# Patient Record
Sex: FEMALE | Race: WHITE | NOT HISPANIC OR LATINO | Employment: OTHER | URBAN - METROPOLITAN AREA
[De-identification: names, ages, dates, MRNs, and addresses within clinical notes are randomized per-mention and may not be internally consistent; named-entity substitution may affect disease eponyms.]

---

## 2017-08-07 ENCOUNTER — LAB REQUISITION (OUTPATIENT)
Dept: LAB | Facility: HOSPITAL | Age: 57
End: 2017-08-07

## 2017-08-07 ENCOUNTER — AMBULATORY SURGICAL CENTER (OUTPATIENT)
Dept: URBAN - METROPOLITAN AREA SURGERY 9 | Facility: SURGERY | Age: 57
End: 2017-08-07
Payer: COMMERCIAL

## 2017-08-07 DIAGNOSIS — D12.3 BENIGN NEOPLASM OF TRANSVERSE COLON: ICD-10-CM

## 2017-08-07 DIAGNOSIS — Z86.010 HISTORY OF COLONIC POLYPS: ICD-10-CM

## 2017-08-07 DIAGNOSIS — Z86.010 PERSONAL HISTORY OF COLONIC POLYPS: ICD-10-CM

## 2017-08-07 PROCEDURE — 45380 COLONOSCOPY AND BIOPSY: CPT | Mod: 33

## 2017-08-07 PROCEDURE — 88305 TISSUE EXAM BY PATHOLOGIST: CPT | Performed by: INTERNAL MEDICINE

## 2017-08-09 LAB
CYTO UR: NORMAL
LAB AP CASE REPORT: NORMAL
LAB AP CLINICAL INFORMATION: NORMAL
Lab: NORMAL
PATH REPORT.FINAL DX SPEC: NORMAL
PATH REPORT.GROSS SPEC: NORMAL

## 2021-02-19 ENCOUNTER — TELEPHONE (OUTPATIENT)
Dept: INTERNAL MEDICINE | Facility: CLINIC | Age: 61
End: 2021-02-19

## 2021-02-19 NOTE — TELEPHONE ENCOUNTER
Caller: Florinda Sunshine    Relationship: Self    Best call back number: [phone]      Medication needed: GABAPENTIN 300 MG CAPSULE 1 CAPSULE 3X DAY, 90 DAY SUPPLY    When do you need the refill by: ASAP    What details did the patient provide when requesting the medication: PATIENT HAD TO RESCHEDULE AN APPOINTMENT DUE TO A COVID EXPOSURE AND WILL RUN OUT OF THE MEDICATION BEFORE THE NEW APPOINTMENT.     Does the patient have less than a 3 day supply:  [] Yes  [x] No    What is the patient's preferred pharmacy: [facility][id number] - [place], [place] - [address] [address] AT [facility] [address] - [phone]  - [phone]

## 2021-03-12 ENCOUNTER — OFFICE VISIT (OUTPATIENT)
Dept: INTERNAL MEDICINE | Facility: CLINIC | Age: 61
End: 2021-03-12

## 2021-03-12 VITALS
RESPIRATION RATE: 18 BRPM | HEART RATE: 90 BPM | DIASTOLIC BLOOD PRESSURE: 76 MMHG | OXYGEN SATURATION: 97 % | SYSTOLIC BLOOD PRESSURE: 124 MMHG | HEIGHT: 61 IN | WEIGHT: 162 LBS | BODY MASS INDEX: 30.58 KG/M2 | TEMPERATURE: 97.3 F

## 2021-03-12 DIAGNOSIS — Z11.59 ENCOUNTER FOR HCV SCREENING TEST FOR LOW RISK PATIENT: ICD-10-CM

## 2021-03-12 DIAGNOSIS — M54.2 NECK PAIN: ICD-10-CM

## 2021-03-12 DIAGNOSIS — R23.2 HOT FLASHES: ICD-10-CM

## 2021-03-12 DIAGNOSIS — Z00.00 WELL ADULT EXAM: Primary | ICD-10-CM

## 2021-03-12 DIAGNOSIS — Z11.4 ENCOUNTER FOR SCREENING FOR HIV: ICD-10-CM

## 2021-03-12 PROCEDURE — 99396 PREV VISIT EST AGE 40-64: CPT | Performed by: INTERNAL MEDICINE

## 2021-03-12 RX ORDER — GABAPENTIN 300 MG/1
300 CAPSULE ORAL 3 TIMES DAILY
COMMUNITY
Start: 2021-02-19 | End: 2021-03-12 | Stop reason: SDUPTHER

## 2021-03-12 RX ORDER — GABAPENTIN 300 MG/1
300 CAPSULE ORAL 3 TIMES DAILY
Qty: 270 CAPSULE | Refills: 0 | Status: SHIPPED | OUTPATIENT
Start: 2021-03-17 | End: 2021-04-28 | Stop reason: SDUPTHER

## 2021-03-12 RX ORDER — GUAIFENESIN, PSEUDOEPHEDRINE HYDROCHLORIDE 600; 60 MG/1; MG/1
TABLET, EXTENDED RELEASE ORAL
COMMUNITY

## 2021-03-12 RX ORDER — HYDROCORTISONE ACETATE 0.5 %
CREAM (GRAM) TOPICAL
COMMUNITY

## 2021-03-12 RX ORDER — GABAPENTIN 300 MG/1
300 CAPSULE ORAL 3 TIMES DAILY
Start: 2021-03-12 | End: 2021-03-12 | Stop reason: SDUPTHER

## 2021-03-12 RX ORDER — FLUTICASONE PROPIONATE 50 MCG
SPRAY, SUSPENSION (ML) NASAL
COMMUNITY

## 2021-03-12 RX ORDER — ESTRADIOL 1 MG/1
1 TABLET ORAL DAILY
Qty: 30 TABLET | Refills: 11 | Status: SHIPPED | OUTPATIENT
Start: 2021-03-12 | End: 2023-03-13

## 2021-03-12 RX ORDER — NAPROXEN SODIUM 220 MG
TABLET ORAL
COMMUNITY
End: 2021-03-12 | Stop reason: SDUPTHER

## 2021-03-12 RX ORDER — ESTRADIOL 1 MG/1
1 TABLET ORAL DAILY
COMMUNITY
Start: 2020-10-12 | End: 2021-03-12 | Stop reason: SDUPTHER

## 2021-03-12 RX ORDER — MELATONIN/PYRIDOXINE HCL (B6) 10 MG-10MG
TABLET,IMMED, EXTENDED RELEASE, BIPHASIC ORAL
COMMUNITY

## 2021-03-12 RX ORDER — METAXALONE 800 MG/1
800 TABLET ORAL 3 TIMES DAILY
Qty: 90 TABLET | Refills: 2 | Status: SHIPPED | OUTPATIENT
Start: 2021-03-12 | End: 2021-04-28 | Stop reason: SDUPTHER

## 2021-03-12 RX ORDER — DIPHENOXYLATE HYDROCHLORIDE AND ATROPINE SULFATE 2.5; .025 MG/1; MG/1
TABLET ORAL
COMMUNITY

## 2021-03-12 RX ORDER — INFLUENZA A VIRUS A/SINGAPORE/GP1908/2015 IVR-180 (H1N1) ANTIGEN (MDCK CELL DERIVED, PROPIOLACTONE INACTIVATED), INFLUENZA A VIRUS A/NORTH CAROLINA/04/2016 (H3N2) HEMAGGLUTININ ANTIGEN (MDCK CELL DERIVED, PROPIOLACTONE INACTIVATED), INFLUENZA B VIRUS B/IOWA/06/2017 HEMAGGLUTININ ANTIGEN (MDCK CELL DERIVED, PROPIOLACTONE INACTIVATED), INFLUENZA B VIRUS B/SINGAPORE/INFTT-16-0610/2016 HEMAGGLUTININ ANTIGEN (MDCK CELL DERIVED, PROPIOLACTONE INACTIVATED) 15; 15; 15; 15 UG/.5ML; UG/.5ML; UG/.5ML; UG/.5ML
INJECTION, SUSPENSION INTRAMUSCULAR
Status: ON HOLD | COMMUNITY
Start: 2020-10-09 | End: 2022-08-19

## 2021-03-12 RX ORDER — NAPROXEN SODIUM 220 MG
220 TABLET ORAL 2 TIMES DAILY WITH MEALS
Start: 2021-03-12 | End: 2021-04-28 | Stop reason: SDUPTHER

## 2021-03-12 RX ORDER — LORATADINE 10 MG/1
TABLET ORAL
COMMUNITY

## 2021-03-12 NOTE — PROGRESS NOTES
"Chief Complaint   Patient presents with   • Annual Exam       Subjective   Florinda Sunshine is a 61 y.o. female.     Having some neck pain, gabapentin previously worked well but not working as well recently; no numbness, tingling; no radiating pain, no issues with arm, hands, numbness, tingling       The following portions of the patient's history were reviewed and updated as appropriate: allergies, current medications, past family history, past medical history, past social history, past surgical history, and problem list.    Review of Systems      Objective   Body mass index is 30.61 kg/m².   Vitals:    03/12/21 0854   BP: 124/76   BP Location: Left arm   Patient Position: Sitting   Cuff Size: Adult   Pulse: 90   Resp: 18   Temp: 97.3 °F (36.3 °C)   SpO2: 97%   Weight: 73.5 kg (162 lb)   Height: 154.9 cm (61\")        Physical Exam      Current Outpatient Medications:   •  Acetaminophen (TYLENOL ARTHRITIS EXT RELIEF PO), , Disp: , Rfl:   •  calcium carb-cholecalciferol (Calcium 600+D) 600-800 MG-UNIT tablet, , Disp: , Rfl:   •  esomeprazole (nexIUM 24HR) 20 MG capsule, Take 1 capsule by mouth Every Morning Before Breakfast., Disp: 90 capsule, Rfl: 3  •  estradiol (ESTRACE) 1 MG tablet, Take 1 tablet by mouth Daily for 360 days., Disp: 30 tablet, Rfl: 11  •  fluticasone (FLONASE) 50 MCG/ACT nasal spray, , Disp: , Rfl:   •  gabapentin (NEURONTIN) 300 MG capsule, Take 1 capsule by mouth 3 (Three) Times a Day., Disp: , Rfl:   •  Glucosamine-Chondroit-Vit C-Mn (Glucosamine 1500 Complex) capsule, , Disp: , Rfl:   •  Influenza Vac Subunit Quad (Flucelvax Quadrivalent) 0.5 ML suspension prefilled syringe injection, , Disp: , Rfl:   •  loratadine (Claritin) 10 MG tablet, , Disp: , Rfl:   •  Melatonin 10-10 MG tablet controlled-release, , Disp: , Rfl:   •  multivitamin (THERAGRAN) tablet tablet, Take  by mouth., Disp: , Rfl:   •  naproxen sodium (ALEVE) 220 MG tablet, Take 1 tablet by mouth 2 (Two) Times a Day With Meals., Disp: " , Rfl:   •  Probiotic Product (PROBIOTIC-10 PO), , Disp: , Rfl:   •  pseudoephedrine-guaifenesin (Mucinex D)  MG per 12 hr tablet, , Disp: , Rfl:   •  metaxalone (Skelaxin) 800 MG tablet, Take 1 tablet by mouth 3 (Three) Times a Day., Disp: 90 tablet, Rfl: 2     No results found for: CBCDIF, CMP, LIPIDINTERP, HGBA1C, TSH, SZWU52SP, PSA, TESTOSTERONE     Health Maintenance   Topic Date Due   • COLONOSCOPY  Never done   • ZOSTER VACCINE (1 of 2) 01/18/2011   • INFLUENZA VACCINE  08/01/2020   • MAMMOGRAM  10/12/2022   • TDAP/TD VACCINES (3 - Td) 02/06/2030        Immunization History   Administered Date(s) Administered   • Hepatitis A 01/21/2019, 08/02/2019   • Hepatitis B 08/02/2019   • Influenza, Unspecified 02/01/2019   • Tdap 01/01/2010, 02/06/2020   • Zoster, Unspecified 10/17/2019, 02/24/2020         Assessment/Plan   Diagnoses and all orders for this visit:    1. Well adult exam (Primary)  -     CBC & Differential  -     Comprehensive Metabolic Panel  -     TSH  -     Lipid Panel With / Chol / HDL Ratio  -     Vitamin D 25 Hydroxy  -     Hemoglobin A1c    2. Encounter for screening for HIV  -     HIV-1/O/2 Ag/Ab w Reflex    3. Encounter for HCV screening test for low risk patient  -     Hepatitis C antibody    4. Neck pain  -     metaxalone (Skelaxin) 800 MG tablet; Take 1 tablet by mouth 3 (Three) Times a Day.  Dispense: 90 tablet; Refill: 2  -     Ambulatory Referral to Physical Therapy Evaluate and treat  -     gabapentin (NEURONTIN) 300 MG capsule; Take 1 capsule by mouth 3 (Three) Times a Day.  -     naproxen sodium (ALEVE) 220 MG tablet; Take 1 tablet by mouth 2 (Two) Times a Day With Meals.    - follow up 6 weeks and consider further evaluation, imaging, procedural intervention pending above    5. Hot flashes  -     estradiol (ESTRACE) 1 MG tablet; Take 1 tablet by mouth Daily for 360 days.  Dispense: 30 tablet; Refill: 11    Other orders  -     esomeprazole (nexIUM 24HR) 20 MG capsule; Take 1  capsule by mouth Every Morning Before Breakfast.  Dispense: 90 capsule; Refill: 3             Well adult exam  - labs checked and evaluated    PAP - done in 2020, normal, with GYN, get records  Mammogram - done in 10/12/2020, normal, with GYN at [facility]  Colonoscopy - 8/7/2017,  SINGLE FRAGMENT OF TUBULAR ADENOMA WITH ONLY LOW-GRADE DYSPLASIA, next scheduled 2022 she states  Glaucoma - yearly  AAA - na, never smoekr  Lung cancer - na, never smoker  DXA - at 66  HIV - checking  HCV - checking  DM - checking  HLD - checking  Smoking - na, never smoker  Depression - no, [id number]  Vaccines - received covid vaccine 2 days ago, holding on other vaccines for now  Falls - na  Alcohol Screening - na    Discussed mental health, sexual health, substance use, abuse, anticipatory guidance given.      Return in about 6 weeks (around 4/23/2021).     Harlan Figueredo MD  [facility]  Internal Medicine and Pediatrics  Phone: [phone]  Fax: [phone]

## 2021-03-13 LAB
25(OH)D3+25(OH)D2 SERPL-MCNC: 71.6 NG/ML (ref 30–100)
ALBUMIN SERPL-MCNC: 4.6 G/DL (ref 3.5–5.2)
ALBUMIN/GLOB SERPL: 2.1 G/DL
ALP SERPL-CCNC: 76 U/L (ref 39–117)
ALT SERPL-CCNC: 13 U/L (ref 1–33)
AST SERPL-CCNC: 22 U/L (ref 1–32)
BASOPHILS # BLD AUTO: 0.04 10*3/MM3 (ref 0–0.2)
BASOPHILS NFR BLD AUTO: 0.8 % (ref 0–1.5)
BILIRUB SERPL-MCNC: 0.4 MG/DL (ref 0–1.2)
BUN SERPL-MCNC: 7 MG/DL (ref 8–23)
BUN/CREAT SERPL: 10.6 (ref 7–25)
CALCIUM SERPL-MCNC: 9.5 MG/DL (ref 8.6–10.5)
CHLORIDE SERPL-SCNC: 102 MMOL/L (ref 98–107)
CHOLEST SERPL-MCNC: 205 MG/DL (ref 0–200)
CHOLEST/HDLC SERPL: 2.47 {RATIO}
CO2 SERPL-SCNC: 26.4 MMOL/L (ref 22–29)
CREAT SERPL-MCNC: 0.66 MG/DL (ref 0.57–1)
EOSINOPHIL # BLD AUTO: 0.11 10*3/MM3 (ref 0–0.4)
EOSINOPHIL NFR BLD AUTO: 2.3 % (ref 0.3–6.2)
ERYTHROCYTE [DISTWIDTH] IN BLOOD BY AUTOMATED COUNT: 12 % (ref 12.3–15.4)
GLOBULIN SER CALC-MCNC: 2.2 GM/DL
GLUCOSE SERPL-MCNC: 94 MG/DL (ref 65–99)
HBA1C MFR BLD: 5.3 % (ref 4.8–5.6)
HCT VFR BLD AUTO: 41.8 % (ref 34–46.6)
HCV AB S/CO SERPL IA: <0.1 S/CO RATIO (ref 0–0.9)
HDLC SERPL-MCNC: 83 MG/DL (ref 40–60)
HGB BLD-MCNC: 14 G/DL (ref 12–15.9)
HIV 1+2 AB+HIV1 P24 AG SERPL QL IA: NON REACTIVE
IMM GRANULOCYTES # BLD AUTO: 0.01 10*3/MM3 (ref 0–0.05)
IMM GRANULOCYTES NFR BLD AUTO: 0.2 % (ref 0–0.5)
LDLC SERPL CALC-MCNC: 108 MG/DL (ref 0–100)
LYMPHOCYTES # BLD AUTO: 1.73 10*3/MM3 (ref 0.7–3.1)
LYMPHOCYTES NFR BLD AUTO: 36.2 % (ref 19.6–45.3)
MCH RBC QN AUTO: 30 PG (ref 26.6–33)
MCHC RBC AUTO-ENTMCNC: 33.5 G/DL (ref 31.5–35.7)
MCV RBC AUTO: 89.5 FL (ref 79–97)
MONOCYTES # BLD AUTO: 0.38 10*3/MM3 (ref 0.1–0.9)
MONOCYTES NFR BLD AUTO: 7.9 % (ref 5–12)
NEUTROPHILS # BLD AUTO: 2.51 10*3/MM3 (ref 1.7–7)
NEUTROPHILS NFR BLD AUTO: 52.6 % (ref 42.7–76)
NRBC BLD AUTO-RTO: 0 /100 WBC (ref 0–0.2)
PLATELET # BLD AUTO: 329 10*3/MM3 (ref 140–450)
POTASSIUM SERPL-SCNC: 4.4 MMOL/L (ref 3.5–5.2)
PROT SERPL-MCNC: 6.8 G/DL (ref 6–8.5)
RBC # BLD AUTO: 4.67 10*6/MM3 (ref 3.77–5.28)
SODIUM SERPL-SCNC: 138 MMOL/L (ref 136–145)
TRIGL SERPL-MCNC: 78 MG/DL (ref 0–150)
TSH SERPL DL<=0.005 MIU/L-ACNC: 1.09 UIU/ML (ref 0.27–4.2)
VLDLC SERPL CALC-MCNC: 14 MG/DL (ref 5–40)
WBC # BLD AUTO: 4.78 10*3/MM3 (ref 3.4–10.8)

## 2021-04-01 ENCOUNTER — TREATMENT (OUTPATIENT)
Dept: PHYSICAL THERAPY | Facility: CLINIC | Age: 61
End: 2021-04-01

## 2021-04-01 DIAGNOSIS — M62.9 MUSCULOSKELETAL DISORDER INVOLVING UPPER TRAPEZIUS MUSCLE: ICD-10-CM

## 2021-04-01 DIAGNOSIS — R52 PAIN AGGRAVATED BY LIFTING: ICD-10-CM

## 2021-04-01 DIAGNOSIS — M54.2 PAIN, NECK: Primary | ICD-10-CM

## 2021-04-01 PROCEDURE — 97110 THERAPEUTIC EXERCISES: CPT | Performed by: PHYSICAL THERAPIST

## 2021-04-01 PROCEDURE — 97162 PT EVAL MOD COMPLEX 30 MIN: CPT | Performed by: PHYSICAL THERAPIST

## 2021-04-01 NOTE — PROGRESS NOTES
"  Physical Therapy Initial Evaluation and Plan of Care    Patient: Florinda Sunshine   : 1960  Diagnosis/ICD-10 Code:  Pain, neck [M54.2]  Referring practitioner: Harlan Figueerdo MD  Date of Initial Visit: 2021  Today's Date: 2021  Patient seen for 1 sessions           Subjective Questionnaire: NDI:20%      Subjective Evaluation    History of Present Illness  Mechanism of injury: Pt presents for physical therapy evaluation with a 10-12 year history of Rt sided cervical spine pain of insidious onset. Pt states that she received epidurals for her neck pain back when it first started at that her pain has been relieved since then. Pt states that in the last year her Rt neck pain has slowly began to increase. Pt states her cervical spine pain is only on the Rt side and extends from the back of her skull down to her upper shoulder. She also states she sometimes gets a pain down the back of her neck that feels like a \"string is being plucked\". Pt states she has a history of carpal tunnel on both sides and had surgery on her Rt hand 4 years ago and on her Lt hand 2 years ago. Pt states her pain is worse on stressful days and doesn't feel as though activity bothers it. Pt also stated that she does water aerobics and her neck tends to feel much better/looser after her workouts. Pt went to her doctor on 3/12 and was prescribed muscle relaxants, but states she hasn't noticed a difference from taking them and she states she has been using Gabapentin for her neck pain for 10 years. 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      Patient Occupation: Retired teacher Quality of life: good    Pain  Current pain rating: 3  At worst pain ratin (Usually at the end of the day)  Location: Rt cervical spine  Quality: dull ache  Alleviating factors: Donut pillow, used to use heat, icy hot   Exacerbated by: Reading, flexed head position, driving " long distances (tense , hiking)    Diagnostic Tests  X-ray: abnormal (10 years ago, DDD)    Treatments  Previous treatment: physical therapy (10 years ago neck)  Patient Goals  Patient goals for therapy: decreased pain, increased motion, increased strength and return to sport/leisure activities  Patient goal: Reading without pain       Objective          Palpation     Right   Hypertonic in the levator scapulae, suboccipitals and upper trapezius. Tenderness of the levator scapulae, suboccipitals and upper trapezius.     Cervical Spine Comments  Right suboccipitals: Mod.     Right Shoulder Comments  Right levator scapulae: Scapular Mod. Right upper trapezius: Subcranial mod.     Active Range of Motion   Cervical/Thoracic Spine   Cervical    Flexion: Neck active flexion: 100% with pain  Extension: Neck active extension: 100%   Left lateral flexion: Neck active lateral bend left: 75%, pain Rt.   Right lateral flexion: Neck active lateral bend right: 100%   Left rotation: Neck active rotation left: 75%   Right rotation: Neck active rotation right: 75% with pain    Passive Range of Motion   Cervical/Thoracic Spine   Cervical   Left lateral flexion: Neck passive lateral bend left: 75% with pain  Right lateral flexion: Neck passive lateral bend right: 100%     Additional Passive Range of Motion Details  Upglides/downglides C2-C7 WNL    Strength/Myotome Testing     Left Shoulder     Planes of Motion   Flexion: 5   Abduction: 5     Right Shoulder     Planes of Motion   Flexion: 5   Abduction: 4+ (Rt neck pain)     Left Elbow   Flexion: 5  Extension: 5    Right Elbow   Flexion: 5  Extension: 5    Tests   Cervical   Deep neck flexor endurance: 35 seconds    Left   Negative alar ligament integrity and Spurling's sign.     Right   Positive cervical distraction (Relieving).   Negative alar ligament integrity, Spurling's sign and transverse ligament.     Additional Tests Details  Upper trap length test: + concordant  Levator  scap length test: -     Flexion+cervical rotation: Lt - positive on Rt, Rt WNL    Vertebral artery test: negative bilaterally      Assessment & Plan     Assessment  Impairments: abnormal muscle firing, abnormal muscle tone, abnormal or restricted ROM, impaired physical strength, lacks appropriate home exercise program and pain with function  Assessment details: Florinda Sunshine is a 61 y.o. year-old female referred to physical therapy for Rt sided cervical spine pain. She presents with a stable clinical presentation. Signs and symptoms are consistent with physical therapy diagnosis of cervical spine pain with mobility deficits and Rt upper trapezius muscle tone/pain as evidenced by painful/restricted cervical AROM, positive relevant special tests, shoulder strength deficits on Rt/concordant sign, and pain/restriction with cervical PROM. Patient is appropriate for skilled physical therapy in order to reduce pain and increase ease with daily mobility to improve her tolerance for driving long distance or reading her books without increased pain/increased tension in her Rt lateral neck. Plan is to see Pt 2x per week to improve above impairments and activity limitations.    Prognosis: good  Functional Limitations: lifting and uncomfortable because of pain  Goals  Plan Goals: STG's (0-4 weeks)    1. Pt will be compliant with HEP.  2. Pt will demonstrate 5/5 pain free shoulder abduction.  3. Pt will have </=2/10 with cervical flexion + rotation test to the left.  4. Pt will report being able to drive without hiking her shoulders.  5. Pt will have pain free cervical SB to the Lt.    LTG's (4-8 weeks)    1. Pt will be independent with HEP.  2. Pt will have WNL cervical AROM without pain >/=1-2/10  3. Pt will have no symptom provocation with UT length testing on Rt.  4. Pt will decrease NDI score to </= 7%    Plan  Therapy options: will be seen for skilled physical therapy services  Planned modality interventions: cryotherapy,  dry needling, electrical stimulation/Russian stimulation, traction, ultrasound, thermotherapy (hydrocollator packs) and TENS  Planned therapy interventions: ADL retraining, body mechanics training, flexibility, functional ROM exercises, home exercise program, IADL retraining, joint mobilization, therapeutic activities, stretching, strengthening, spinal/joint mobilization, soft tissue mobilization, postural training, neuromuscular re-education, motor coordination training and manual therapy  Frequency: 2x week  Duration in visits: 16  Treatment plan discussed with: patient  Plan details: See assessment        Manual Therapy:    5     mins  37280;  Therapeutic Exercise:     15    mins  73113;     Neuromuscular Katherin:        mins  01466;    Therapeutic Activity:          mins  94430;     Gait Training:           mins  75128;     Ultrasound:          mins  14443;    Electrical Stimulation:         mins  64461 ( );  Dry Needling          mins self-pay    Timed Treatment:   20   mins   Total Treatment:     60   mins    PT SIGNATURE: Amina Navarro PT, DPT; [employer] student  DATE TREATMENT INITIATED: 4/1/2021    Initial Certification  Certification Period: 6/30/2021  I certify that the therapy services are furnished while this patient is under my care.  The services outlined above are required by this patient, and will be reviewed every 90 days.     PHYSICIAN: Harlan Figueredo MD      DATE:     Please sign and return via fax to [phone].. Thank you, [facility].

## 2021-04-05 ENCOUNTER — TREATMENT (OUTPATIENT)
Dept: PHYSICAL THERAPY | Facility: CLINIC | Age: 61
End: 2021-04-05

## 2021-04-05 DIAGNOSIS — M62.9 MUSCULOSKELETAL DISORDER INVOLVING UPPER TRAPEZIUS MUSCLE: ICD-10-CM

## 2021-04-05 DIAGNOSIS — R52 PAIN AGGRAVATED BY LIFTING: ICD-10-CM

## 2021-04-05 DIAGNOSIS — M54.2 PAIN, NECK: Primary | ICD-10-CM

## 2021-04-05 PROCEDURE — 97140 MANUAL THERAPY 1/> REGIONS: CPT | Performed by: PHYSICAL THERAPIST

## 2021-04-05 PROCEDURE — 97110 THERAPEUTIC EXERCISES: CPT | Performed by: PHYSICAL THERAPIST

## 2021-04-05 NOTE — PROGRESS NOTES
Physical Therapy Daily Progress Note      Patient: Florinda Sunshine   : 1960  Diagnosis/ICD-10 Code:  Pain, neck [M54.2]  Referring practitioner: Harlan Figueredo MD  Date of Initial Visit: Type: THERAPY  Noted: 2021  Today's Date: 2021  Patient seen for 2 sessions         Florinda Sunshine reports: Pt states that she has felt a little better since her first day and is getting some relief of symptoms from performing her exercises, but states she felt a new sharp pain in Rt side of her neck on Friday that happened once and hasn't come back.    Subjective     Objective          Passive Range of Motion     Additional Passive Range of Motion Details  PAIVM: Upglide C3-C4 painful      See Exercise, Manual, and Modality Logs for complete treatment.       Assessment & Plan     Assessment  Assessment details: Pt tolerated treatment well. Pt with decreases in pain/complaints of tightness in Rt lateral cervical spine post manual therapy techniques. Pt progressed with SNAG at C3-C4 with upglide to improve opening of C3-C4 and decrease complaints of pain/TTP. Pt progressed with TB rowing exercise to increase LT strength/endurance in order to reciprocally inhibit the UT and decrease tone/complaints of tightness. Plan is to continue to see Pt 2x per week and continue to progress UT strengthening/endurance exercises/cervical spine manual therapy techniques for continued relief of lateral cervical spine tightness/pain.        Progress per Plan of Care           Manual Therapy:     11    mins  98216;  Therapeutic Exercise:     38    mins  90505;     Neuromuscular Katherin:        mins  99471;    Therapeutic Activity:          mins  03575;     Gait Training:           mins  96455;     Ultrasound:          mins  72781;    Electrical Stimulation:        mins  15310 ( );  Dry Needling          mins self-pay    Timed Treatment:   49   mins   Total Treatment:     49   mins    [employer] [place] Student PT    Hiral Linares,  PT  Physical Therapist

## 2021-04-09 ENCOUNTER — TREATMENT (OUTPATIENT)
Dept: PHYSICAL THERAPY | Facility: CLINIC | Age: 61
End: 2021-04-09

## 2021-04-09 DIAGNOSIS — R52 PAIN AGGRAVATED BY LIFTING: ICD-10-CM

## 2021-04-09 DIAGNOSIS — M54.2 PAIN, NECK: Primary | ICD-10-CM

## 2021-04-09 DIAGNOSIS — M62.9 MUSCULOSKELETAL DISORDER INVOLVING UPPER TRAPEZIUS MUSCLE: ICD-10-CM

## 2021-04-09 PROCEDURE — 97110 THERAPEUTIC EXERCISES: CPT | Performed by: PHYSICAL THERAPIST

## 2021-04-09 PROCEDURE — 97530 THERAPEUTIC ACTIVITIES: CPT | Performed by: PHYSICAL THERAPIST

## 2021-04-09 PROCEDURE — 97140 MANUAL THERAPY 1/> REGIONS: CPT | Performed by: PHYSICAL THERAPIST

## 2021-04-09 NOTE — PROGRESS NOTES
Physical Therapy Daily Progress Note    Patient: Florinda Sunshine   : 1960  Diagnosis/ICD-10 Code:  Pain, neck [M54.2]  Referring practitioner: Harlan Figueredo MD  Date of Initial Visit: Type: THERAPY  Noted: 2021  Today's Date: 2021  Patient seen for 3 sessions         Florinda Sunshine reports: Pt states her neck has been feeling better. She states she has noticed less pain/tension in her neck at the end of the day and when she is reading.    Subjective     Objective   See Exercise, Manual, and Modality Logs for complete treatment.       Assessment & Plan     Assessment  Assessment details: Pt tolerate treatment well today. Pt with subjective decreases in pain/tension through her Rt cervical spine. Pt with subjective decreases in pain post manual therapy techniques. Pt progressed with OH pressing exercise with OH shrug and 's carry exercise to increase upper trap strength/endurance for continued reductions in pain/tension in Rt cervical spine/UT. Plan is to continue to see Pt 2x per week and continue to mobilize the upper Rt cervical spine and increase endurance/strength in the UT.        Progress per Plan of Care           Manual Therapy:    13     mins  42199;  Therapeutic Exercise:    19     mins  61214;     Neuromuscular Katherin:        mins  39237;    Therapeutic Activity:     8     mins  39374;     Gait Training:           mins  44940;     Ultrasound:          mins  18072;    Electrical Stimulation:         mins  74912 ( );  Dry Needling          mins self-pay    Timed Treatment:    40  mins   Total Treatment:     45   mins    [employer] [place] [employer] PT    Hiral Linares, PT  Physical Therapist

## 2021-04-14 ENCOUNTER — TREATMENT (OUTPATIENT)
Dept: PHYSICAL THERAPY | Facility: CLINIC | Age: 61
End: 2021-04-14

## 2021-04-14 DIAGNOSIS — M62.9 MUSCULOSKELETAL DISORDER INVOLVING UPPER TRAPEZIUS MUSCLE: ICD-10-CM

## 2021-04-14 DIAGNOSIS — M54.2 PAIN, NECK: Primary | ICD-10-CM

## 2021-04-14 DIAGNOSIS — R52 PAIN AGGRAVATED BY LIFTING: ICD-10-CM

## 2021-04-14 PROCEDURE — 97140 MANUAL THERAPY 1/> REGIONS: CPT | Performed by: PHYSICAL THERAPIST

## 2021-04-14 PROCEDURE — 97530 THERAPEUTIC ACTIVITIES: CPT | Performed by: PHYSICAL THERAPIST

## 2021-04-14 PROCEDURE — 97110 THERAPEUTIC EXERCISES: CPT | Performed by: PHYSICAL THERAPIST

## 2021-04-14 NOTE — PROGRESS NOTES
Physical Therapy Daily Progress Note        Patient: Florinda Sunshine   : 1960  Diagnosis/ICD-10 Code:  Pain, neck [M54.2]  Referring practitioner: Harlan Figueredo MD  Date of Initial Visit: Type: THERAPY  Noted: 2021  Today's Date: 2021  Patient seen for 4 sessions         Florinda Sunshine reports: her neck always bothers her in the morning until she gets up and moves around.  She said it isn't too bad today and rated it a 5/10.  She said it is a little better since she started coming.         Subjective     Objective   See Exercise, Manual, and Modality Logs for complete treatment.       Assessment/Plan  Modified manual treatment thus maintained exercises this date to assess tolerance to changes.  Continued to provide cues for technique especially to decrease shoulder elevation for rows and decrease UT activation with overhead activities.  Will continue to progress scapular strengthening to decrease overuse of UT as pt reported she carries a lot of stress in this area.  She reported her neck felt better and looser after the session.  Will monitor and progress as tolerated.      Progress per Plan of Care           Manual Therapy:    10     mins  40490;  Therapeutic Exercise:    33     mins  78025;     Neuromuscular Katherin:        mins  80113;    Therapeutic Activity:      10    mins  49366;     Gait Training:           mins  59388;     Ultrasound:          mins  71068;    Electrical Stimulation:         mins  09475 ( );  Dry Needling          mins self-pay    Timed Treatment:   53   mins   Total Treatment:     53   mins    Ronda Forte PTA  Physical Therapist Assistant

## 2021-04-19 ENCOUNTER — TREATMENT (OUTPATIENT)
Dept: PHYSICAL THERAPY | Facility: CLINIC | Age: 61
End: 2021-04-19

## 2021-04-19 DIAGNOSIS — M62.9 MUSCULOSKELETAL DISORDER INVOLVING UPPER TRAPEZIUS MUSCLE: ICD-10-CM

## 2021-04-19 DIAGNOSIS — R52 PAIN AGGRAVATED BY LIFTING: ICD-10-CM

## 2021-04-19 DIAGNOSIS — M54.2 PAIN, NECK: Primary | ICD-10-CM

## 2021-04-19 PROCEDURE — 97110 THERAPEUTIC EXERCISES: CPT | Performed by: PHYSICAL THERAPIST

## 2021-04-19 PROCEDURE — 97012 MECHANICAL TRACTION THERAPY: CPT | Performed by: PHYSICAL THERAPIST

## 2021-04-19 NOTE — PROGRESS NOTES
Physical Therapy Daily Progress Note        Patient: Florinda Sunshine   : 1960  Diagnosis/ICD-10 Code:  Pain, neck [M54.2]  Referring practitioner: Harlan Figueredo MD  Date of Initial Visit: Type: THERAPY  Noted: 2021  Today's Date: 2021  Patient seen for 5 sessions         Florinda Sunshine reports: no problems after last session and she reported she can feel like it is getting better and she has less pain.  She reported a little tightness on the (R) but not really pain.         Subjective     Objective   See Exercise, Manual, and Modality Logs for complete treatment.       Assessment/Plan  Maintained exercises this date to assess tolerance to addition of mechanical traction due to improvement reported following manual traction last session. Min cues required for technique with exercises.  No complaints reported with addition of traction.  Plan to progress strengthening reps including prone scapular retraction, Ts, and Ys next session if tolerated.     Progress per Plan of Care           Manual Therapy:    6     mins  59861;  Therapeutic Exercise:    35     mins  81220;     Neuromuscular Katherin:        mins  54191;    Therapeutic Activity:          mins  04379;     Gait Training:           mins  16753;     Ultrasound:          mins  89391;    Electrical Stimulation:         mins  32331 ( );  Dry Needling          mins self-pay  Traction  10 mins    Timed Treatment:   41   mins   Total Treatment:     57   mins    Ronda Forte PTA  Physical Therapist Assistant

## 2021-04-21 ENCOUNTER — TREATMENT (OUTPATIENT)
Dept: PHYSICAL THERAPY | Facility: CLINIC | Age: 61
End: 2021-04-21

## 2021-04-21 DIAGNOSIS — R52 PAIN AGGRAVATED BY LIFTING: ICD-10-CM

## 2021-04-21 DIAGNOSIS — M54.2 PAIN, NECK: Primary | ICD-10-CM

## 2021-04-21 DIAGNOSIS — M62.9 MUSCULOSKELETAL DISORDER INVOLVING UPPER TRAPEZIUS MUSCLE: ICD-10-CM

## 2021-04-21 PROCEDURE — 97012 MECHANICAL TRACTION THERAPY: CPT | Performed by: PHYSICAL THERAPIST

## 2021-04-21 PROCEDURE — 97140 MANUAL THERAPY 1/> REGIONS: CPT | Performed by: PHYSICAL THERAPIST

## 2021-04-21 PROCEDURE — 97110 THERAPEUTIC EXERCISES: CPT | Performed by: PHYSICAL THERAPIST

## 2021-04-21 NOTE — PROGRESS NOTES
Physical Therapy Daily Progress Note    Visit # : 6  Florinda Sunshine reports: she is doing better.  Pt denies having any increased pain or soreness from the traction.  Pt states it still is a little tight in the mornings.  Pt states she needs to leave a little early this morning to get home to get her grandson off the bus.      Subjective     Objective   See Exercise, Manual, and Modality Logs for complete treatment.       Assessment & Plan     Assessment  Assessment details: Pt is tolerating treatment well with decreasing pain and tightness.  Pt still has mild tightness in B suboccipitals left >right.  Pt is responding well to the mechanical ICT without increased soreness, so increased weight by 2 lbs and time to 15 minutes today.  Added prone T and Y.  Pt was able to perform new exercises without difficulty. Will continue to see 1-2x/week for stretching, ICT, and strengthening exercises for another 2-3 weeks with plans to discharge to a [facility] soon.          Progress per Plan of Care           Manual Therapy:    8     mins  25326;  Therapeutic Exercise:   17      mins  33435;     Neuromuscular Katherin:        mins  31864;    Therapeutic Activity:          mins  12201;     Gait Training:           mins  78016;     Ultrasound:          mins  69675;    Electrical Stimulation:         mins  90324 ( );  Dry Needling          mins self-pay  Cervical Traction          15 mins 01435     Timed Treatment:  25    mins   Total Treatment:    53    mins    Hiral Linares, PT  Physical Therapist

## 2021-04-28 ENCOUNTER — TREATMENT (OUTPATIENT)
Dept: PHYSICAL THERAPY | Facility: CLINIC | Age: 61
End: 2021-04-28

## 2021-04-28 ENCOUNTER — OFFICE VISIT (OUTPATIENT)
Dept: INTERNAL MEDICINE | Facility: CLINIC | Age: 61
End: 2021-04-28

## 2021-04-28 VITALS
OXYGEN SATURATION: 98 % | DIASTOLIC BLOOD PRESSURE: 80 MMHG | HEIGHT: 61 IN | HEART RATE: 89 BPM | BODY MASS INDEX: 30.96 KG/M2 | SYSTOLIC BLOOD PRESSURE: 126 MMHG | WEIGHT: 164 LBS | TEMPERATURE: 97.1 F

## 2021-04-28 DIAGNOSIS — M54.2 NECK PAIN: Primary | ICD-10-CM

## 2021-04-28 DIAGNOSIS — M62.9 MUSCULOSKELETAL DISORDER INVOLVING UPPER TRAPEZIUS MUSCLE: ICD-10-CM

## 2021-04-28 DIAGNOSIS — B35.1 ONYCHOMYCOSIS: ICD-10-CM

## 2021-04-28 DIAGNOSIS — R52 PAIN AGGRAVATED BY LIFTING: ICD-10-CM

## 2021-04-28 DIAGNOSIS — M54.2 PAIN, NECK: Primary | ICD-10-CM

## 2021-04-28 PROCEDURE — 97012 MECHANICAL TRACTION THERAPY: CPT | Performed by: PHYSICAL THERAPIST

## 2021-04-28 PROCEDURE — 97140 MANUAL THERAPY 1/> REGIONS: CPT | Performed by: PHYSICAL THERAPIST

## 2021-04-28 PROCEDURE — 99214 OFFICE O/P EST MOD 30 MIN: CPT | Performed by: INTERNAL MEDICINE

## 2021-04-28 PROCEDURE — 97110 THERAPEUTIC EXERCISES: CPT | Performed by: PHYSICAL THERAPIST

## 2021-04-28 RX ORDER — GABAPENTIN 300 MG/1
300 CAPSULE ORAL 3 TIMES DAILY
Qty: 270 CAPSULE | Refills: 0
Start: 2021-04-28 | End: 2021-06-09 | Stop reason: SDUPTHER

## 2021-04-28 RX ORDER — NAPROXEN SODIUM 220 MG
220 TABLET ORAL 2 TIMES DAILY WITH MEALS
Status: ON HOLD
Start: 2021-04-28 | End: 2022-08-19

## 2021-04-28 RX ORDER — TERBINAFINE HYDROCHLORIDE 250 MG/1
250 TABLET ORAL DAILY
Qty: 90 TABLET | Refills: 0 | Status: SHIPPED | OUTPATIENT
Start: 2021-04-28 | End: 2021-08-13

## 2021-04-28 RX ORDER — SODIUM FLUORIDE 6 MG/ML
PASTE, DENTIFRICE DENTAL SEE ADMIN INSTRUCTIONS
COMMUNITY
Start: 2021-04-12

## 2021-04-28 RX ORDER — METAXALONE 800 MG/1
800 TABLET ORAL 3 TIMES DAILY
Qty: 90 TABLET | Refills: 2
Start: 2021-04-28 | End: 2021-08-13

## 2021-04-28 NOTE — PROGRESS NOTES
Physical Therapy Daily Progress Note        Patient: Florinda Sunshine   : 1960  Diagnosis/ICD-10 Code:  Pain, neck [M54.2]  Referring practitioner: Harlan Figueredo MD  Date of Initial Visit: Type: THERAPY  Noted: 2021  Today's Date: 2021  Patient seen for 7 sessions         Florinda Sunshine reports: it's hurting a little more today.  She rated it a 3/10. She hasn't been as good about doing her exercises over the last few days.  She said overall it is getting better.  She saw her MD that sent her here and he said to do what PT thought about continuing.         Subjective     Objective          Palpation   Left   Hypertonic in the upper trapezius.   Tenderness of the upper trapezius.   Trigger point to upper trapezius.     Right   Hypertonic in the upper trapezius. Tenderness of the upper trapezius.   Trigger point to upper trapezius.       See Exercise, Manual, and Modality Logs for complete treatment.       Assessment/Plan  Increased (B) UT tightness with trigger points noted.  Greater tightness (R) then (L).  Added STM to address and continued with suboccipital release/distraction with ROM.  Progressed scapular strengthening with (B) shoulder ER and continued to provide cues for increased rhomboid, MT, and LT activation with scapular strengthening to decrease stress on UT.  Maintained traction due to length of time between sessions.  Will continue to monitor and progress strengthening and flexibility as tolerated.     Progress per Plan of Care           Manual Therapy:    16     mins  07594;  Therapeutic Exercise:    31     mins  49419;     Neuromuscular Katherin:        mins  68850;    Therapeutic Activity:          mins  06481;     Gait Training:           mins  72287;     Ultrasound:          mins  09726;    Electrical Stimulation:         mins  52895 ( );  Dry Needling          mins self-pay  Traction  15 mins    Timed Treatment:   47   mins   Total Treatment:     65   mins    Ronda PRICE  Jann, MARK  Physical Therapist Assistant

## 2021-04-28 NOTE — PROGRESS NOTES
"Chief Complaint  Pain (Neck follow up p PT )    Subjective          Florinda Sunshine presents to [facility]  Neck pain is better, has had several physical therapy appts; feels rom is better, less pain; no numbness, tingling; no weakness    Also with right first toe nail thickening, yellowing for years; no pain      Objective   Vital Signs:   /80 (BP Location: Left arm, Patient Position: Sitting, Cuff Size: Adult)   Pulse 89   Temp 97.1 °F (36.2 °C)   Ht 154.9 cm (61\")   Wt 74.4 kg (164 lb)   SpO2 98%   BMI 30.99 kg/m²     Physical Exam  Vitals and nursing note reviewed.   Constitutional:       Appearance: Normal appearance.   HENT:      Head: Normocephalic and atraumatic.      Right Ear: External ear normal.      Left Ear: External ear normal.      Nose: Nose normal.      Mouth/Throat:      Mouth: Mucous membranes are moist.      Pharynx: Oropharynx is clear.   Eyes:      Extraocular Movements: Extraocular movements intact.      Conjunctiva/sclera: Conjunctivae normal.   Pulmonary:      Effort: Pulmonary effort is normal. No respiratory distress.   Musculoskeletal:         General: Normal range of motion.      Cervical back: Normal range of motion.      Comments: Right great toe nail with yellow thickened nail   Skin:     Findings: No rash.   Neurological:      General: No focal deficit present.      Mental Status: She is alert. Mental status is at baseline.   Psychiatric:         Mood and Affect: Mood normal.         Behavior: Behavior normal.         Thought Content: Thought content normal.         Judgment: Judgment normal.        Result Review :                 Assessment and Plan    Diagnoses and all orders for this visit:    1. Neck pain (Primary)  -     metaxalone (Skelaxin) 800 MG tablet; Take 1 tablet by mouth 3 (Three) Times a Day.  Dispense: 90 tablet; Refill: 2  -     naproxen sodium (ALEVE) 220 MG tablet; Take 1 tablet by mouth 2 (Two) Times a Day " With Meals.  -     gabapentin (NEURONTIN) 300 MG capsule; Take 1 capsule by mouth 3 (Three) Times a Day.  Dispense: 270 capsule; Refill: 0    2. Onychomycosis  -     terbinafine (lamiSIL) 250 MG tablet; Take 1 tablet by mouth Daily.  Dispense: 90 tablet; Refill: 0    - continue PT, stretching, exercise, meds; dicsussed risks and benefits of above  - rtc 6 weeks    Follow Up   Return in about 6 weeks (around 6/9/2021) for Recheck.  Patient was given instructions and counseling regarding her condition or for health maintenance advice. Please see specific information pulled into the AVS if appropriate.

## 2021-04-30 ENCOUNTER — TREATMENT (OUTPATIENT)
Dept: PHYSICAL THERAPY | Facility: CLINIC | Age: 61
End: 2021-04-30

## 2021-04-30 DIAGNOSIS — R52 PAIN AGGRAVATED BY LIFTING: ICD-10-CM

## 2021-04-30 DIAGNOSIS — M62.9 MUSCULOSKELETAL DISORDER INVOLVING UPPER TRAPEZIUS MUSCLE: ICD-10-CM

## 2021-04-30 DIAGNOSIS — M54.2 PAIN, NECK: Primary | ICD-10-CM

## 2021-04-30 PROCEDURE — 97530 THERAPEUTIC ACTIVITIES: CPT | Performed by: PHYSICAL THERAPIST

## 2021-04-30 PROCEDURE — 97012 MECHANICAL TRACTION THERAPY: CPT | Performed by: PHYSICAL THERAPIST

## 2021-04-30 PROCEDURE — 97110 THERAPEUTIC EXERCISES: CPT | Performed by: PHYSICAL THERAPIST

## 2021-04-30 NOTE — PROGRESS NOTES
Physical Therapy Daily Progress Note/Reassessment     Visit # : 8  Florinda Sunshine reports: it has bothered her more than normal, nagging into right shoulder since last visit.  Rates pain a 4/10 today, but unually it is a 2/10.  Pt only has increased pain with driving if she is driving for a longer distance, but feels she is doing much better about not hiking my shoulders since she started PT.  Pt states she just came from the , after doing one of the pool classes.   Pt denies having any N/T.       Subjective     Objective          Postural Observations    Additional Postural Observation Details  Pt with a minimal lower cervical/upper cervical thoracic kyphosis     Palpation   Left   Hypertonic in the scalenes.     Right   Hypertonic in the levator scapulae, scalenes, sternocleidomastoid and upper trapezius. Tenderness of the levator scapulae, scalenes, sternocleidomastoid and upper trapezius.     Additional Palpation Details  Mild right UT palpable tightness and tenderness today     Tenderness     Additional Tenderness Details  Pt denies tenderness over the B OA, spinous process or facet joints along the entire cervical spine     Active Range of Motion   Cervical/Thoracic Spine   Cervical    Flexion: 36 degrees with pain  Extension: 45 degrees   Left lateral flexion: 39 degrees   Right lateral flexion: 23 degrees with pain  Left rotation: 55 degrees   Right rotation: 42 degrees     Strength/Myotome Testing     Left Shoulder     Planes of Motion   Flexion: 5   Abduction: 5   External rotation at 0°: 4   Internal rotation at 0°: 5     Right Shoulder     Planes of Motion   Flexion: 4+   Abduction: 4+   External rotation at 0°: 5   Internal rotation at 0°: 5     Left Elbow   Flexion: 5  Extension: 5    Right Elbow   Flexion: 5  Extension: 5    Left Wrist/Hand   Wrist extension: 4+    Right Wrist/Hand   Wrist extension: 5    Additional Strength Details  B thumb abd right 4/5, left 4+/5  b finger add 5/5 B       See  Exercise, Manual, and Modality Logs for complete treatment.       Assessment & Plan     Assessment  Assessment details: Pt is making progress towards goals.  Pt has decreased pain, improved ROM, and improving functional mobility with less pain.  Pt with some increase in tightness, tenderness, and pain today in the right UT, but feel this is due to the added prone T and Y exercises.  Held weights and prone strengthening exercises today due to pt's recent flare up.  Pt has met 5/9 goals and is showing progress towards other goals.  Will continue to see pt 1-2x/week for another month with plans for discharge to a HEP soon, if symptoms continue to decrease.      Goals  Plan Goals: STG's (0-4 weeks)    1. Pt will be compliant with HEP.  MET   2. Pt will demonstrate 5/5 pain free shoulder abduction.  PAIN FREE BUT 4+/5   3. Pt will have </=2/10 with cervical flexion + rotation test to the left.  NO PAIN WITH ROT, BUT STILL WITH FLEX   4. Pt will report being able to drive without hiking her shoulders.  MET   5. Pt will have pain free cervical SB to the Lt.  MET     LTG's (4-8 weeks)    1. Pt will be independent with HEP.  MET   2. Pt will have WNL cervical AROM without pain >/=1-2/10  NOT MET   3. Pt will have no symptom provocation with UT length testing on Rt. MET, PAIN TODAY WITH SHORTENING UT   4. Pt will decrease NDI score to </= 7%  NOT MET 22%      Progress per Plan of Care           Manual Therapy:         mins  30544;  Therapeutic Exercise:   20      mins  00898;     Neuromuscular Katherin:        mins  55207;    Therapeutic Activity:    15      mins  51998;     Gait Training:           mins  87941;     Ultrasound:          mins  61392;    Electrical Stimulation:         mins  31511 ( );  Dry Needling          mins self-pay  Cervical Traction          15 mins [id number]     Timed Treatment:   35   mins   Total Treatment:     51   mins    Hiral Linares, PT  Physical Therapist

## 2021-05-21 ENCOUNTER — TREATMENT (OUTPATIENT)
Dept: PHYSICAL THERAPY | Facility: CLINIC | Age: 61
End: 2021-05-21

## 2021-05-21 ENCOUNTER — DOCUMENTATION (OUTPATIENT)
Dept: PHYSICAL THERAPY | Facility: CLINIC | Age: 61
End: 2021-05-21

## 2021-05-21 DIAGNOSIS — M62.9 MUSCULOSKELETAL DISORDER INVOLVING UPPER TRAPEZIUS MUSCLE: ICD-10-CM

## 2021-05-21 DIAGNOSIS — R52 PAIN AGGRAVATED BY LIFTING: ICD-10-CM

## 2021-05-21 DIAGNOSIS — M54.2 PAIN, NECK: Primary | ICD-10-CM

## 2021-05-21 PROCEDURE — 97530 THERAPEUTIC ACTIVITIES: CPT | Performed by: PHYSICAL THERAPIST

## 2021-05-21 PROCEDURE — 97140 MANUAL THERAPY 1/> REGIONS: CPT | Performed by: PHYSICAL THERAPIST

## 2021-05-21 NOTE — PROGRESS NOTES
Physical Therapy Daily Progress Note        Patient: Florinda Sunshine   : 1960  Diagnosis/ICD-10 Code:  Pain, neck [M54.2]  Referring practitioner: Harlan Figueredo MD  Date of Initial Visit: Type: THERAPY  Noted: 2021  Today's Date: 2021  Patient seen for 9 sessions         Florinda Sunshine reports: she was in pain for a couple days after the last 2 sessions.  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  She said she has a little discomfort today but it is feeling pretty good since she just got out of a pool class.         Subjective Evaluation    Pain  Current pain ratin  At worst pain ratin (was up to a 9/10 after last session)         NDI: 22%    Objective          Palpation   Left   Hypertonic in the levator scapulae, scalenes and upper trapezius.     Right   Hypertonic in the levator scapulae, scalenes and upper trapezius.     Additional Palpation Details  Mild tightness (B) middle trap/rhomboids      See Exercise, Manual, and Modality Logs for complete treatment.       Assessment/Plan  Pt discharged this date due to increased pain following last 2 sessions, independent with HEP, and being able to manage pain while out of town for the last 3 weeks.  She continued to present with tightness in cervical musculature and slight decreased ROM however this was measured prior to exercises and stretching which may have affected results.  Added STM this date which relieved tightness and decreased pain.  Discussed and educated pt on dry needling PRN if tightness returns to assist with self management of condition.  Pt verbalized understanding.    Discharged to [facility]           Manual Therapy:    20     mins  94968;  Therapeutic Exercise:         mins  03891;     Neuromuscular Katherin:        mins  71981;    Therapeutic Activity:     15     mins  24364;     Gait Training:           mins  99645;     Ultrasound:           mins  35947;    Electrical Stimulation:         mins  94911 ( );  Dry Needling          mins self-pay    Timed Treatment:   35   mins   Total Treatment:     45

## 2021-06-09 ENCOUNTER — OFFICE VISIT (OUTPATIENT)
Dept: INTERNAL MEDICINE | Facility: CLINIC | Age: 61
End: 2021-06-09

## 2021-06-09 VITALS
OXYGEN SATURATION: 98 % | WEIGHT: 163 LBS | RESPIRATION RATE: 16 BRPM | SYSTOLIC BLOOD PRESSURE: 130 MMHG | BODY MASS INDEX: 30.78 KG/M2 | TEMPERATURE: 97.6 F | HEIGHT: 61 IN | HEART RATE: 76 BPM | DIASTOLIC BLOOD PRESSURE: 74 MMHG

## 2021-06-09 DIAGNOSIS — Z51.81 THERAPEUTIC DRUG MONITORING: Primary | ICD-10-CM

## 2021-06-09 DIAGNOSIS — M54.2 NECK PAIN: ICD-10-CM

## 2021-06-09 DIAGNOSIS — J30.2 SEASONAL ALLERGIES: ICD-10-CM

## 2021-06-09 PROCEDURE — 99214 OFFICE O/P EST MOD 30 MIN: CPT | Performed by: INTERNAL MEDICINE

## 2021-06-09 RX ORDER — GABAPENTIN 300 MG/1
300 CAPSULE ORAL 3 TIMES DAILY
Qty: 270 CAPSULE | Refills: 0
Start: 2021-06-09 | End: 2021-06-09

## 2021-06-09 RX ORDER — MONTELUKAST SODIUM 10 MG/1
10 TABLET ORAL NIGHTLY
Qty: 30 TABLET | Refills: 3 | Status: SHIPPED | OUTPATIENT
Start: 2021-06-09 | End: 2021-07-21 | Stop reason: SDUPTHER

## 2021-06-09 RX ORDER — GABAPENTIN 300 MG/1
300 CAPSULE ORAL 3 TIMES DAILY
Qty: 270 CAPSULE | Refills: 0 | Status: SHIPPED | OUTPATIENT
Start: 2021-06-09 | End: 2021-09-20 | Stop reason: SDUPTHER

## 2021-06-09 NOTE — PROGRESS NOTES
"Chief Complaint  Nail Problem (follow up ), Pain (Neck/ follow up ), and Med Refill    Subjective          Florinda Sunshine presents to [facility]  Sinus congestion for last 2 weeks, post nasal drainage, no facial pain/pressure, no fevers; not worsening, has been consistent symptoms; does have yearly allergies    Neck pain, taking gabapentin 300mg TID, doing well; completed PT recently, doing well; no issues or side effects with the meds; no drowsiness, no lightheadedness    Oncyhomycosis, doing well with terbinafine, no side effects      Objective   Vital Signs:   /74 (BP Location: Left arm, Patient Position: Sitting, Cuff Size: Adult)   Pulse 76   Temp 97.6 °F (36.4 °C)   Resp 16   Ht 154.9 cm (61\")   Wt 73.9 kg (163 lb)   SpO2 98%   BMI 30.80 kg/m²     Physical Exam  Vitals and nursing note reviewed.   Constitutional:       Appearance: Normal appearance.   HENT:      Head: Normocephalic and atraumatic.      Right Ear: External ear normal.      Left Ear: External ear normal.      Nose: Nose normal.      Mouth/Throat:      Mouth: Mucous membranes are moist.      Pharynx: Oropharynx is clear.   Eyes:      Extraocular Movements: Extraocular movements intact.      Conjunctiva/sclera: Conjunctivae normal.   Pulmonary:      Effort: Pulmonary effort is normal. No respiratory distress.   Musculoskeletal:         General: Normal range of motion.      Cervical back: Normal range of motion.      Comments: No cervical pain, no midline pain to palpation    Right great toe with improvement in yellowing   Skin:     Findings: No rash.   Neurological:      General: No focal deficit present.      Mental Status: She is alert. Mental status is at baseline.   Psychiatric:         Mood and Affect: Mood normal.         Behavior: Behavior normal.         Thought Content: Thought content normal.         Judgment: Judgment normal.        Result Review :                 Assessment " and Plan    Diagnoses and all orders for this visit:    1. Therapeutic drug monitoring (Primary)  -     Comprehensive metabolic panel    2. Neck pain  -     Discontinue: gabapentin (NEURONTIN) 300 MG capsule; Take 1 capsule by mouth 3 (Three) Times a Day.  Dispense: 270 capsule; Refill: 0  -     gabapentin (NEURONTIN) 300 MG capsule; Take 1 capsule by mouth 3 (Three) Times a Day.  Dispense: 270 capsule; Refill: 0    3. Seasonal allergies  -     montelukast (Singulair) 10 MG tablet; Take 1 tablet by mouth Every Night.  Dispense: 30 tablet; Refill: 3    - continue terbinafine for nail changes, discussed risks and benefits, check labs today  - contine gabapentin doing well, counseled on risks and benefits, check ector ok  - add singulair for seasonal allergies, consider sinusitis infectious component if worsening, lack of improvement  - rtc to follow up 6 weeks      Follow Up   Return in about 6 weeks (around 7/21/2021) for Recheck.  Patient was given instructions and counseling regarding her condition or for health maintenance advice. Please see specific information pulled into the AVS if appropriate.

## 2021-06-10 LAB
ALBUMIN SERPL-MCNC: 4.8 G/DL (ref 3.5–5.2)
ALBUMIN/GLOB SERPL: 2.4 G/DL
ALP SERPL-CCNC: 77 U/L (ref 39–117)
ALT SERPL-CCNC: 11 U/L (ref 1–33)
AST SERPL-CCNC: 16 U/L (ref 1–32)
BILIRUB SERPL-MCNC: 0.3 MG/DL (ref 0–1.2)
BUN SERPL-MCNC: 8 MG/DL (ref 8–23)
BUN/CREAT SERPL: 12.1 (ref 7–25)
CALCIUM SERPL-MCNC: 9.5 MG/DL (ref 8.6–10.5)
CHLORIDE SERPL-SCNC: 100 MMOL/L (ref 98–107)
CO2 SERPL-SCNC: 24.8 MMOL/L (ref 22–29)
CREAT SERPL-MCNC: 0.66 MG/DL (ref 0.57–1)
GLOBULIN SER CALC-MCNC: 2 GM/DL
GLUCOSE SERPL-MCNC: 87 MG/DL (ref 65–99)
POTASSIUM SERPL-SCNC: 4.4 MMOL/L (ref 3.5–5.2)
PROT SERPL-MCNC: 6.8 G/DL (ref 6–8.5)
SODIUM SERPL-SCNC: 139 MMOL/L (ref 136–145)

## 2021-07-21 ENCOUNTER — OFFICE VISIT (OUTPATIENT)
Dept: INTERNAL MEDICINE | Facility: CLINIC | Age: 61
End: 2021-07-21

## 2021-07-21 VITALS
HEART RATE: 80 BPM | SYSTOLIC BLOOD PRESSURE: 128 MMHG | TEMPERATURE: 98.2 F | DIASTOLIC BLOOD PRESSURE: 72 MMHG | WEIGHT: 161 LBS | RESPIRATION RATE: 18 BRPM | BODY MASS INDEX: 30.4 KG/M2 | HEIGHT: 61 IN | OXYGEN SATURATION: 98 %

## 2021-07-21 DIAGNOSIS — J30.2 SEASONAL ALLERGIES: ICD-10-CM

## 2021-07-21 DIAGNOSIS — B35.1 ONYCHOMYCOSIS: Primary | ICD-10-CM

## 2021-07-21 PROCEDURE — 99213 OFFICE O/P EST LOW 20 MIN: CPT | Performed by: INTERNAL MEDICINE

## 2021-07-21 RX ORDER — MONTELUKAST SODIUM 10 MG/1
10 TABLET ORAL NIGHTLY
Qty: 30 TABLET | Refills: 3 | Status: SHIPPED | OUTPATIENT
Start: 2021-07-21 | End: 2021-09-20 | Stop reason: SDUPTHER

## 2021-07-21 NOTE — PROGRESS NOTES
"Chief Complaint  Allergies (follow up ) and Nail Problem (follow up )    Subjective          Florinda Sunshine presents to [facility]  Onychomycosis, doing well, feels nail changes are significantly beter, no pain, no side effects    Seasonal allergies, doing well with singulair, still taking anti histamine and flonase; notes significant improvement with addition of singulair last visit 1 month ago; no side effects; no sob      Objective   Vital Signs:   /72 (BP Location: Left arm, Patient Position: Sitting, Cuff Size: Adult)   Pulse 80   Temp 98.2 °F (36.8 °C)   Resp 18   Ht 154.9 cm (61\")   Wt 73 kg (161 lb)   SpO2 98%   BMI 30.42 kg/m²     Physical Exam  Vitals and nursing note reviewed.   Constitutional:       Appearance: Normal appearance.   HENT:      Head: Normocephalic and atraumatic.      Right Ear: External ear normal.      Left Ear: External ear normal.      Nose: Nose normal.      Mouth/Throat:      Mouth: Mucous membranes are moist.      Pharynx: Oropharynx is clear.   Eyes:      Extraocular Movements: Extraocular movements intact.      Conjunctiva/sclera: Conjunctivae normal.      Pupils: Pupils are equal, round, and reactive to light.   Cardiovascular:      Rate and Rhythm: Normal rate and regular rhythm.      Pulses: Normal pulses.      Heart sounds: Normal heart sounds.   Pulmonary:      Effort: Pulmonary effort is normal.      Breath sounds: Normal breath sounds. No wheezing, rhonchi or rales.   Abdominal:      General: Abdomen is flat. There is no distension.      Palpations: Abdomen is soft.      Tenderness: There is no abdominal tenderness.   Musculoskeletal:         General: Normal range of motion.      Cervical back: Normal range of motion and neck supple.      Right lower leg: No edema.      Left lower leg: No edema.   Skin:     General: Skin is warm and dry.      Capillary Refill: Capillary refill takes less than 2 seconds.      " Findings: No rash.   Neurological:      General: No focal deficit present.      Mental Status: She is alert and oriented to person, place, and time. Mental status is at baseline.   Psychiatric:         Mood and Affect: Mood normal.         Behavior: Behavior normal.        Result Review :                 Assessment and Plan    Diagnoses and all orders for this visit:    1. Onychomycosis (Primary)  - doing well, has 1 week of meds left to complete; counseled on risks and benefits  - rtc to follow up worsening, change in recurrence    2. Seasonal allergies  -     montelukast (Singulair) 10 MG tablet; Take 1 tablet by mouth Every Night.  Dispense: 30 tablet; Refill: 3  - continue singulair, discussed risks and benefits, counseled on continued use of flonase, zyrtec  - rtc to follow up, wosrening, change in illness        Follow Up   Return in about 7 weeks (around 9/8/2021) for Recheck.  Patient was given instructions and counseling regarding her condition or for health maintenance advice. Please see specific information pulled into the AVS if appropriate.

## 2021-09-20 ENCOUNTER — OFFICE VISIT (OUTPATIENT)
Dept: INTERNAL MEDICINE | Facility: CLINIC | Age: 61
End: 2021-09-20

## 2021-09-20 VITALS
WEIGHT: 163 LBS | SYSTOLIC BLOOD PRESSURE: 136 MMHG | BODY MASS INDEX: 30.78 KG/M2 | OXYGEN SATURATION: 98 % | HEART RATE: 70 BPM | TEMPERATURE: 97.2 F | HEIGHT: 61 IN | DIASTOLIC BLOOD PRESSURE: 70 MMHG | RESPIRATION RATE: 18 BRPM

## 2021-09-20 DIAGNOSIS — Z51.81 THERAPEUTIC DRUG MONITORING: Primary | ICD-10-CM

## 2021-09-20 DIAGNOSIS — J30.2 SEASONAL ALLERGIES: ICD-10-CM

## 2021-09-20 DIAGNOSIS — M54.2 NECK PAIN: ICD-10-CM

## 2021-09-20 PROCEDURE — 99213 OFFICE O/P EST LOW 20 MIN: CPT | Performed by: INTERNAL MEDICINE

## 2021-09-20 RX ORDER — GABAPENTIN 300 MG/1
300 CAPSULE ORAL 3 TIMES DAILY
Qty: 270 CAPSULE | Refills: 0 | Status: SHIPPED | OUTPATIENT
Start: 2021-09-20 | End: 2021-11-23 | Stop reason: SDUPTHER

## 2021-09-20 NOTE — TELEPHONE ENCOUNTER
Rx Refill Note  Requested Prescriptions     Pending Prescriptions Disp Refills   • montelukast (Singulair) 10 MG tablet 30 tablet 3     Sig: Take 1 tablet by mouth Every Night.   • gabapentin (NEURONTIN) 300 MG capsule 270 capsule 0     Sig: Take 1 capsule by mouth 3 (Three) Times a Day.      Last office visit with prescribing clinician: 9/20/2021      Next office visit with prescribing clinician: Visit date not found            Hsamukh Mondragon MA  09/20/21, 13:14 EDT

## 2021-09-22 LAB
AMPHETAMINES UR QL SCN: NEGATIVE NG/ML
BARBITURATES UR QL SCN: NEGATIVE NG/ML
BENZODIAZ UR QL SCN: NEGATIVE NG/ML
BZE UR QL SCN: NEGATIVE NG/ML
CANNABINOIDS UR QL SCN: NEGATIVE NG/ML
CREAT UR-MCNC: 18.2 MG/DL (ref 20–300)
LABORATORY COMMENT REPORT: ABNORMAL
METHADONE UR QL SCN: NEGATIVE NG/ML
OPIATES UR QL SCN: NEGATIVE NG/ML
OXYCODONE+OXYMORPHONE UR QL SCN: NEGATIVE NG/ML
PCP UR QL: NEGATIVE NG/ML
PH UR: 6.3 [PH] (ref 4.5–8.9)
PROPOXYPH UR QL SCN: NEGATIVE NG/ML
SP GR UR: 1

## 2021-09-22 RX ORDER — MONTELUKAST SODIUM 10 MG/1
10 TABLET ORAL NIGHTLY
Qty: 30 TABLET | Refills: 3 | Status: SHIPPED | OUTPATIENT
Start: 2021-09-22 | End: 2022-02-20

## 2021-09-22 RX ORDER — GABAPENTIN 300 MG/1
300 CAPSULE ORAL 3 TIMES DAILY
Qty: 270 CAPSULE | Refills: 0 | OUTPATIENT
Start: 2021-09-22

## 2021-09-22 NOTE — PROGRESS NOTES
"Chief Complaint  Pain (neck ), Allergies, and Med Refill    Subjective          Florinda Sunshine presents to [facility]  Her e to follow up neck pain, chronic pain; doing well with gabapentin, no side effects; tolerating well without drowsiness or fatigue      Objective   Vital Signs:   /70   Pulse 70   Temp 97.2 °F (36.2 °C)   Resp 18   Ht 154.9 cm (61\")   Wt 73.9 kg (163 lb)   SpO2 98%   BMI 30.80 kg/m²     Physical Exam  Vitals and nursing note reviewed.   Constitutional:       Appearance: Normal appearance.   HENT:      Head: Normocephalic and atraumatic.      Right Ear: External ear normal.      Left Ear: External ear normal.      Nose: Nose normal.      Mouth/Throat:      Mouth: Mucous membranes are moist.      Pharynx: Oropharynx is clear.   Eyes:      Extraocular Movements: Extraocular movements intact.      Conjunctiva/sclera: Conjunctivae normal.   Pulmonary:      Effort: Pulmonary effort is normal. No respiratory distress.   Musculoskeletal:         General: Normal range of motion.      Cervical back: Normal range of motion.   Skin:     Findings: No rash.   Neurological:      General: No focal deficit present.      Mental Status: She is alert. Mental status is at baseline.   Psychiatric:         Mood and Affect: Mood normal.         Behavior: Behavior normal.         Thought Content: Thought content normal.         Judgment: Judgment normal.        Result Review :                 Assessment and Plan    Diagnoses and all orders for this visit:    1. Therapeutic drug monitoring (Primary)  -     Urine Drug Screen - Urine, Clean Catch    2. Neck pain  -     gabapentin (NEURONTIN) 300 MG capsule; Take 1 capsule by mouth 3 (Three) Times a Day.  Dispense: 270 capsule; Refill: 0    - check ector graham ok  - continue gabapentin doing wlel wihtout issues; no side effects; counseled on risks and benefits  - rtc to follow up 3 months or sooner as " needed      Follow Up   No follow-ups on file.  Patient was given instructions and counseling regarding her condition or for health maintenance advice. Please see specific information pulled into the AVS if appropriate.

## 2021-11-23 ENCOUNTER — TELEMEDICINE (OUTPATIENT)
Dept: INTERNAL MEDICINE | Facility: CLINIC | Age: 61
End: 2021-11-23

## 2021-11-23 DIAGNOSIS — M54.2 NECK PAIN: ICD-10-CM

## 2021-11-23 PROCEDURE — 99213 OFFICE O/P EST LOW 20 MIN: CPT | Performed by: INTERNAL MEDICINE

## 2021-11-23 RX ORDER — GABAPENTIN 300 MG/1
300 CAPSULE ORAL 3 TIMES DAILY
Qty: 270 CAPSULE | Refills: 0 | Status: SHIPPED | OUTPATIENT
Start: 2021-12-20 | End: 2022-06-15

## 2021-11-23 NOTE — PROGRESS NOTES
Chief Complaint  Pain    Subjective          Florinda Sunshine presents to [facility]  Chronic pain doing well with gabapenitn, no side effects; no drowsiness or lightheadedness    You have chosen to receive care through a telehealth visit.  Do you consent to use a video/audio connection for your medical care today? Yes    Objective   Vital Signs:   There were no vitals taken for this visit.    Physical Exam  Vitals and nursing note reviewed.   Constitutional:       Appearance: Normal appearance.   HENT:      Head: Normocephalic and atraumatic.      Right Ear: External ear normal.      Left Ear: External ear normal.      Nose: Nose normal.      Mouth/Throat:      Mouth: Mucous membranes are moist.      Pharynx: Oropharynx is clear.   Eyes:      Extraocular Movements: Extraocular movements intact.      Conjunctiva/sclera: Conjunctivae normal.   Pulmonary:      Effort: Pulmonary effort is normal. No respiratory distress.   Musculoskeletal:         General: Normal range of motion.      Cervical back: Normal range of motion.   Skin:     Findings: No rash.   Neurological:      General: No focal deficit present.      Mental Status: She is alert. Mental status is at baseline.   Psychiatric:         Mood and Affect: Mood normal.         Behavior: Behavior normal.         Thought Content: Thought content normal.         Judgment: Judgment normal.        Result Review :                 Assessment and Plan    Diagnoses and all orders for this visit:    1. Neck pain  -     gabapentin (NEURONTIN) 300 MG capsule; Take 1 capsule by mouth 3 (Three) Times a Day.  Dispense: 270 capsule; Refill: 0    - continue gabapenitn, doing well, counseled on risks and benefits of long term therapy, management  - check ector ok; sent in rx to be filled next month while I am out of office on family leave  - check uds when in office  - rtc to follow up 3 months      Follow Up   No follow-ups on  file.  Patient was given instructions and counseling regarding her condition or for health maintenance advice. Please see specific information pulled into the AVS if appropriate.

## 2022-01-21 ENCOUNTER — TELEPHONE (OUTPATIENT)
Dept: GASTROENTEROLOGY | Facility: CLINIC | Age: 62
End: 2022-01-21

## 2022-01-21 ENCOUNTER — PREP FOR SURGERY (OUTPATIENT)
Dept: SURGERY | Facility: SURGERY CENTER | Age: 62
End: 2022-01-21

## 2022-01-21 DIAGNOSIS — Z86.010 PERSONAL HISTORY OF COLONIC POLYPS: Primary | ICD-10-CM

## 2022-01-21 DIAGNOSIS — Z01.818 OTHER SPECIFIED PRE-OPERATIVE EXAMINATION: ICD-10-CM

## 2022-01-21 NOTE — TELEPHONE ENCOUNTER
FAST TRACK; 5 yr recall C/S- personal hx of polyps  Last scope 08/07/17  Schedule at EP     Orders in please schedule

## 2022-01-31 PROBLEM — Z86.010 PERSONAL HISTORY OF COLONIC POLYPS: Status: ACTIVE | Noted: 2022-01-31

## 2022-01-31 PROBLEM — Z86.0100 PERSONAL HISTORY OF COLONIC POLYPS: Status: ACTIVE | Noted: 2022-01-31

## 2022-01-31 NOTE — TELEPHONE ENCOUNTER
SPOKE WITH PATIENT.  SCHEDULED AT [facility] ON 08/19/2022 AT 8:45AM - ARRIVE 7:30AM.  WILL MAIL INSTRUCRIONS.

## 2022-02-20 DIAGNOSIS — J30.2 SEASONAL ALLERGIES: ICD-10-CM

## 2022-02-20 RX ORDER — MONTELUKAST SODIUM 10 MG/1
TABLET ORAL
Qty: 30 TABLET | Refills: 3 | Status: SHIPPED | OUTPATIENT
Start: 2022-02-20 | End: 2022-09-22 | Stop reason: SDUPTHER

## 2022-06-15 ENCOUNTER — OFFICE VISIT (OUTPATIENT)
Dept: INTERNAL MEDICINE | Facility: CLINIC | Age: 62
End: 2022-06-15

## 2022-06-15 VITALS
TEMPERATURE: 98 F | RESPIRATION RATE: 18 BRPM | BODY MASS INDEX: 31.57 KG/M2 | HEIGHT: 61 IN | SYSTOLIC BLOOD PRESSURE: 128 MMHG | HEART RATE: 65 BPM | OXYGEN SATURATION: 97 % | DIASTOLIC BLOOD PRESSURE: 74 MMHG | WEIGHT: 167.2 LBS

## 2022-06-15 DIAGNOSIS — Z51.81 THERAPEUTIC DRUG MONITORING: ICD-10-CM

## 2022-06-15 DIAGNOSIS — M19.049 ARTHRITIS OF HAND: ICD-10-CM

## 2022-06-15 DIAGNOSIS — M54.2 NECK PAIN: ICD-10-CM

## 2022-06-15 DIAGNOSIS — B35.1 ONYCHOMYCOSIS: ICD-10-CM

## 2022-06-15 DIAGNOSIS — Z00.00 WELL ADULT EXAM: Primary | ICD-10-CM

## 2022-06-15 PROCEDURE — 99396 PREV VISIT EST AGE 40-64: CPT | Performed by: INTERNAL MEDICINE

## 2022-06-15 RX ORDER — TERBINAFINE HYDROCHLORIDE 250 MG/1
250 TABLET ORAL DAILY
Qty: 90 TABLET | Refills: 0 | Status: SHIPPED | OUTPATIENT
Start: 2022-06-15 | End: 2022-12-13

## 2022-06-15 RX ORDER — GABAPENTIN 300 MG/1
300 CAPSULE ORAL DAILY
Qty: 90 CAPSULE | Refills: 0 | Status: SHIPPED | OUTPATIENT
Start: 2022-06-15 | End: 2022-09-22 | Stop reason: SDUPTHER

## 2022-06-15 RX ORDER — MELOXICAM 15 MG/1
15 TABLET ORAL DAILY PRN
Qty: 30 TABLET | Refills: 2 | Status: SHIPPED | OUTPATIENT
Start: 2022-06-15 | End: 2022-09-22 | Stop reason: SDUPTHER

## 2022-06-15 NOTE — PROGRESS NOTES
"Chief Complaint   Patient presents with   • Nail Problem     Right big toe/ black    • Annual Exam   • Pain     Therapeutic drug monitoring        Subjective   Florinda Sunshine is a 62 y.o. female.     Having issues with falling asleep, was previously taking melatonin without benefit; once she is sleeping she stays asleep; no napping; no significant change in life, work, homelife, stressors    Chronic pain, neuropathy gabapentin, no issues with side effects, no drowsiness, no lightheadedness; taking daily in the morning now and feels this is helping       The following portions of the patient's history were reviewed and updated as appropriate: allergies, current medications, past family history, past medical history, past social history, past surgical history, and problem list.    Review of Systems      Objective   Body mass index is 31.59 kg/m².   Vitals:    06/15/22 1029   BP: 128/74   Pulse: 65   Resp: 18   Temp: 98 °F (36.7 °C)   SpO2: 97%   Weight: 75.8 kg (167 lb 3.2 oz)   Height: 154.9 cm (61\")        Physical Exam  Vitals and nursing note reviewed.   Constitutional:       Appearance: Normal appearance.   HENT:      Head: Normocephalic and atraumatic.      Right Ear: Tympanic membrane, ear canal and external ear normal.      Left Ear: Tympanic membrane, ear canal and external ear normal.      Nose: Nose normal.      Mouth/Throat:      Mouth: Mucous membranes are moist.      Pharynx: Oropharynx is clear.   Eyes:      Extraocular Movements: Extraocular movements intact.      Conjunctiva/sclera: Conjunctivae normal.      Pupils: Pupils are equal, round, and reactive to light.   Cardiovascular:      Rate and Rhythm: Normal rate and regular rhythm.      Pulses: Normal pulses.      Heart sounds: Normal heart sounds.   Pulmonary:      Effort: Pulmonary effort is normal.      Breath sounds: Normal breath sounds. No wheezing, rhonchi or rales.   Abdominal:      General: Abdomen is flat. There is no distension.      " Palpations: Abdomen is soft.      Tenderness: There is no abdominal tenderness.   Musculoskeletal:         General: Normal range of motion.      Cervical back: Normal range of motion and neck supple.      Right lower leg: No edema.      Left lower leg: No edema.   Skin:     General: Skin is warm and dry.      Capillary Refill: Capillary refill takes less than 2 seconds.      Findings: No rash.   Neurological:      General: No focal deficit present.      Mental Status: She is alert and oriented to person, place, and time. Mental status is at baseline.   Psychiatric:         Mood and Affect: Mood normal.         Behavior: Behavior normal.           Current Outpatient Medications:   •  Acetaminophen (TYLENOL ARTHRITIS EXT RELIEF PO), , Disp: , Rfl:   •  calcium carb-cholecalciferol 600-800 MG-UNIT tablet, , Disp: , Rfl:   •  esomeprazole (nexIUM 24HR) 20 MG capsule, Take 1 capsule by mouth Every Morning Before Breakfast., Disp: 90 capsule, Rfl: 3  •  estradiol (ESTRACE) 1 MG tablet, Take 1 tablet by mouth Daily for 360 days. (Patient taking differently: Take 0.5 mg by mouth Daily.), Disp: 30 tablet, Rfl: 11  •  fluticasone (FLONASE) 50 MCG/ACT nasal spray, , Disp: , Rfl:   •  gabapentin (NEURONTIN) 300 MG capsule, Take 1 capsule by mouth 3 (Three) Times a Day., Disp: 270 capsule, Rfl: 0  •  Glucosamine-Chondroit-Vit C-Mn (Glucosamine 1500 Complex) capsule, , Disp: , Rfl:   •  Influenza Vac Subunit Quad (Flucelvax Quadrivalent) 0.5 ML suspension prefilled syringe injection, , Disp: , Rfl:   •  loratadine (CLARITIN) 10 MG tablet, , Disp: , Rfl:   •  Melatonin 10-10 MG tablet controlled-release, , Disp: , Rfl:   •  montelukast (SINGULAIR) 10 MG tablet, TAKE 1 TABLET BY MOUTH EVERY DAY AT NIGHT, Disp: 30 tablet, Rfl: 3  •  multivitamin (THERAGRAN) tablet tablet, Take  by mouth., Disp: , Rfl:   •  naproxen sodium (ALEVE) 220 MG tablet, Take 1 tablet by mouth 2 (Two) Times a Day With Meals., Disp: , Rfl:   •  PreviDent 5000  Booster Plus 1.1 % paste, See Admin Instructions., Disp: , Rfl:   •  Probiotic Product (PROBIOTIC-10 PO), , Disp: , Rfl:   •  pseudoephedrine-guaifenesin (MUCINEX D)  MG per 12 hr tablet, , Disp: , Rfl:   •  meloxicam (MOBIC) 15 MG tablet, Take 1 tablet by mouth Daily As Needed for Mild Pain ., Disp: 30 tablet, Rfl: 2  •  terbinafine (lamiSIL) 250 MG tablet, Take 1 tablet by mouth Daily., Disp: 90 tablet, Rfl: 0     Lab Results   Component Value Date    HGBA1C 5.30 03/12/2021    TSH 1.090 03/12/2021    LUOV26ZE 71.6 03/12/2021        Health Maintenance   Topic Date Due   • ZOSTER VACCINE (1 of 2) 01/18/2011   • INFLUENZA VACCINE  10/01/2022   • MAMMOGRAM  10/15/2023   • TDAP/TD VACCINES (4 - Td or Tdap) 02/06/2030        Immunization History   Administered Date(s) Administered   • COVID-19 (PFIZER) PURPLE CAP 03/10/2021, 04/07/2021, 12/03/2021   • Flu Vaccine Quad PF >36MO 10/04/2017, 09/21/2018   • Fluzone Split Quad (Multi-dose) 10/11/2021   • Hep B, Unspecified 08/02/2019   • Hepatitis A 01/21/2019, 08/02/2019   • Hepatitis B 08/02/2019   • Influenza, Unspecified 02/01/2019, 10/01/2019, 10/09/2020   • Td 10/17/1996   • Tdap 01/01/2010, 02/06/2020   • Zoster, Unspecified 10/17/2019, 02/24/2020         Assessment & Plan   Diagnoses and all orders for this visit:    1. Well adult exam (Primary)  -     CBC & Differential  -     Comprehensive Metabolic Panel  -     Lipid Panel With / Chol / HDL Ratio  -     Vitamin D 25 Hydroxy  -     Hemoglobin A1c    2. Onychomycosis  -     Ambulatory Referral to Podiatry  -     terbinafine (lamiSIL) 250 MG tablet; Take 1 tablet by mouth Daily.  Dispense: 90 tablet; Refill: 0    3. Arthritis of hand  -     meloxicam (MOBIC) 15 MG tablet; Take 1 tablet by mouth Daily As Needed for Mild Pain .  Dispense: 30 tablet; Refill: 2    4. Therapeutic drug monitoring  -     Urine Drug Screen - Urine, Clean Catch    - start terbinafine, had good reults in the past; may benefit from  podiatry evaluation ordered today  - discussed continued gabapentin doing well; cousneled on risks/benefits/alternatives  - rtc worsening, change in illness  - uds  - ector         Well adult exam  - labs checked and evaluated    PAP - due in 7/2022, normal in 7/2021  Mammogram - due in 7/2022, normal in 7/2021  Colonoscopy - normal in 8/2017, next in 2022, scheduled now  Glaucoma - yearly  AAA - na  Lung cancer - na  DXA - at 66  HIV - neg  HCV - neg  DM - checking  HLD - checking  Smoking - no  Depression - no  Vaccines - up to date, needs covid #4  Falls - no  Alcohol Screening - no    Discussed mental health, sexual health, substance use, abuse, anticipatory guidance given.      No follow-ups on file.     Harlan Figueredo MD  [facility]  Internal Medicine and Pediatrics  Phone: [phone]  Fax: [phone]

## 2022-06-17 LAB
25(OH)D3+25(OH)D2 SERPL-MCNC: 68.9 NG/ML (ref 30–100)
ALBUMIN SERPL-MCNC: 4.6 G/DL (ref 3.8–4.8)
ALBUMIN/GLOB SERPL: 2 {RATIO} (ref 1.2–2.2)
ALP SERPL-CCNC: 83 IU/L (ref 44–121)
ALT SERPL-CCNC: 16 IU/L (ref 0–32)
AMPHETAMINES UR QL SCN: NEGATIVE NG/ML
AST SERPL-CCNC: 21 IU/L (ref 0–40)
BARBITURATES UR QL SCN: NEGATIVE NG/ML
BASOPHILS # BLD AUTO: 0 X10E3/UL (ref 0–0.2)
BASOPHILS NFR BLD AUTO: 1 %
BENZODIAZ UR QL SCN: NEGATIVE NG/ML
BILIRUB SERPL-MCNC: 0.4 MG/DL (ref 0–1.2)
BUN SERPL-MCNC: 10 MG/DL (ref 8–27)
BUN/CREAT SERPL: 14 (ref 12–28)
BZE UR QL SCN: NEGATIVE NG/ML
CALCIUM SERPL-MCNC: 10 MG/DL (ref 8.7–10.3)
CANNABINOIDS UR QL SCN: NEGATIVE NG/ML
CHLORIDE SERPL-SCNC: 100 MMOL/L (ref 96–106)
CHOLEST SERPL-MCNC: 230 MG/DL (ref 100–199)
CHOLEST/HDLC SERPL: 3 RATIO (ref 0–4.4)
CO2 SERPL-SCNC: 25 MMOL/L (ref 20–29)
CREAT SERPL-MCNC: 0.72 MG/DL (ref 0.57–1)
CREAT UR-MCNC: 18.6 MG/DL (ref 20–300)
EGFRCR SERPLBLD CKD-EPI 2021: 95 ML/MIN/1.73
EOSINOPHIL # BLD AUTO: 0.1 X10E3/UL (ref 0–0.4)
EOSINOPHIL NFR BLD AUTO: 2 %
ERYTHROCYTE [DISTWIDTH] IN BLOOD BY AUTOMATED COUNT: 12.1 % (ref 11.7–15.4)
GLOBULIN SER CALC-MCNC: 2.3 G/DL (ref 1.5–4.5)
GLUCOSE SERPL-MCNC: 92 MG/DL (ref 65–99)
HBA1C MFR BLD: 5.5 % (ref 4.8–5.6)
HCT VFR BLD AUTO: 42.4 % (ref 34–46.6)
HDLC SERPL-MCNC: 76 MG/DL
HGB BLD-MCNC: 13.8 G/DL (ref 11.1–15.9)
IMM GRANULOCYTES # BLD AUTO: 0 X10E3/UL (ref 0–0.1)
IMM GRANULOCYTES NFR BLD AUTO: 0 %
LABORATORY COMMENT REPORT: ABNORMAL
LDLC SERPL CALC-MCNC: 141 MG/DL (ref 0–99)
LYMPHOCYTES # BLD AUTO: 1.9 X10E3/UL (ref 0.7–3.1)
LYMPHOCYTES NFR BLD AUTO: 33 %
MCH RBC QN AUTO: 29 PG (ref 26.6–33)
MCHC RBC AUTO-ENTMCNC: 32.5 G/DL (ref 31.5–35.7)
MCV RBC AUTO: 89 FL (ref 79–97)
METHADONE UR QL SCN: NEGATIVE NG/ML
MONOCYTES # BLD AUTO: 0.4 X10E3/UL (ref 0.1–0.9)
MONOCYTES NFR BLD AUTO: 7 %
NEUTROPHILS # BLD AUTO: 3.4 X10E3/UL (ref 1.4–7)
NEUTROPHILS NFR BLD AUTO: 57 %
OPIATES UR QL SCN: NEGATIVE NG/ML
OXYCODONE+OXYMORPHONE UR QL SCN: NEGATIVE NG/ML
PCP UR QL: NEGATIVE NG/ML
PH UR: 6.9 [PH] (ref 4.5–8.9)
PLATELET # BLD AUTO: 342 X10E3/UL (ref 150–450)
POTASSIUM SERPL-SCNC: 5 MMOL/L (ref 3.5–5.2)
PROPOXYPH UR QL SCN: NEGATIVE NG/ML
PROT SERPL-MCNC: 6.9 G/DL (ref 6–8.5)
RBC # BLD AUTO: 4.76 X10E6/UL (ref 3.77–5.28)
SODIUM SERPL-SCNC: 138 MMOL/L (ref 134–144)
SP GR UR: 1
TRIGL SERPL-MCNC: 77 MG/DL (ref 0–149)
VLDLC SERPL CALC-MCNC: 13 MG/DL (ref 5–40)
WBC # BLD AUTO: 5.8 X10E3/UL (ref 3.4–10.8)

## 2022-08-10 ENCOUNTER — PREP FOR SURGERY (OUTPATIENT)
Dept: SURGERY | Facility: SURGERY CENTER | Age: 62
End: 2022-08-10

## 2022-08-19 ENCOUNTER — ANESTHESIA (OUTPATIENT)
Dept: SURGERY | Facility: SURGERY CENTER | Age: 62
End: 2022-08-19

## 2022-08-19 ENCOUNTER — ANESTHESIA EVENT (OUTPATIENT)
Dept: SURGERY | Facility: SURGERY CENTER | Age: 62
End: 2022-08-19

## 2022-08-19 ENCOUNTER — HOSPITAL ENCOUNTER (OUTPATIENT)
Facility: SURGERY CENTER | Age: 62
Setting detail: HOSPITAL OUTPATIENT SURGERY
Discharge: HOME | End: 2022-08-19
Attending: INTERNAL MEDICINE | Admitting: INTERNAL MEDICINE

## 2022-08-19 VITALS
SYSTOLIC BLOOD PRESSURE: 109 MMHG | BODY MASS INDEX: 31.11 KG/M2 | RESPIRATION RATE: 16 BRPM | OXYGEN SATURATION: 98 % | WEIGHT: 164.8 LBS | HEIGHT: 61 IN | TEMPERATURE: 97.8 F | HEART RATE: 72 BPM | DIASTOLIC BLOOD PRESSURE: 66 MMHG

## 2022-08-19 DIAGNOSIS — Z86.010 PERSONAL HISTORY OF COLONIC POLYPS: ICD-10-CM

## 2022-08-19 PROCEDURE — 0 LIDOCAINE 1 % SOLUTION: Performed by: ANESTHESIOLOGY

## 2022-08-19 PROCEDURE — 45380 COLONOSCOPY AND BIOPSY: CPT | Performed by: INTERNAL MEDICINE

## 2022-08-19 PROCEDURE — 25010000002 PROPOFOL 10 MG/ML EMULSION: Performed by: ANESTHESIOLOGY

## 2022-08-19 PROCEDURE — 88305 TISSUE EXAM BY PATHOLOGIST: CPT | Performed by: INTERNAL MEDICINE

## 2022-08-19 RX ORDER — SODIUM CHLORIDE 0.9 % (FLUSH) 0.9 %
10 SYRINGE (ML) INJECTION AS NEEDED
Status: DISCONTINUED | OUTPATIENT
Start: 2022-08-19 | End: 2022-08-19 | Stop reason: HOSPADM

## 2022-08-19 RX ORDER — LIDOCAINE HYDROCHLORIDE 10 MG/ML
0.5 INJECTION, SOLUTION INFILTRATION; PERINEURAL ONCE AS NEEDED
Status: DISCONTINUED | OUTPATIENT
Start: 2022-08-19 | End: 2022-08-19 | Stop reason: HOSPADM

## 2022-08-19 RX ORDER — PROPOFOL 10 MG/ML
VIAL (ML) INTRAVENOUS AS NEEDED
Status: DISCONTINUED | OUTPATIENT
Start: 2022-08-19 | End: 2022-08-19 | Stop reason: SURG

## 2022-08-19 RX ORDER — LIDOCAINE HYDROCHLORIDE 10 MG/ML
INJECTION, SOLUTION INFILTRATION; PERINEURAL AS NEEDED
Status: DISCONTINUED | OUTPATIENT
Start: 2022-08-19 | End: 2022-08-19 | Stop reason: SURG

## 2022-08-19 RX ORDER — MAGNESIUM HYDROXIDE 1200 MG/15ML
LIQUID ORAL AS NEEDED
Status: DISCONTINUED | OUTPATIENT
Start: 2022-08-19 | End: 2022-08-19 | Stop reason: HOSPADM

## 2022-08-19 RX ORDER — SODIUM CHLORIDE, SODIUM LACTATE, POTASSIUM CHLORIDE, CALCIUM CHLORIDE 600; 310; 30; 20 MG/100ML; MG/100ML; MG/100ML; MG/100ML
1000 INJECTION, SOLUTION INTRAVENOUS CONTINUOUS
Status: DISCONTINUED | OUTPATIENT
Start: 2022-08-19 | End: 2022-08-19 | Stop reason: HOSPADM

## 2022-08-19 RX ADMIN — SODIUM CHLORIDE, POTASSIUM CHLORIDE, SODIUM LACTATE AND CALCIUM CHLORIDE 1000 ML: 600; 310; 30; 20 INJECTION, SOLUTION INTRAVENOUS at 08:21

## 2022-08-19 RX ADMIN — PROPOFOL 100 MG: 10 INJECTION, EMULSION INTRAVENOUS at 08:55

## 2022-08-19 RX ADMIN — LIDOCAINE HYDROCHLORIDE 30 MG: 10 INJECTION, SOLUTION INFILTRATION; PERINEURAL at 08:55

## 2022-08-19 RX ADMIN — PROPOFOL 140 MCG/KG/MIN: 10 INJECTION, EMULSION INTRAVENOUS at 08:55

## 2022-08-19 NOTE — H&P
Patient Care Team:  Harlan Figueredo MD as PCP - General (Internal Medicine)    CHIEF COMPLAINT: Personal hx colon polyps    HISTORY OF PRESENT ILLNESS:  Last exam was 2017    Past Medical History:   Diagnosis Date   • Acute sinusitis     UNSPECIFIED   • AR (allergic rhinitis)    • Cellulitis, unspecified    • Cervical spondylosis without myelopathy    • Cough    • Dermatophytosis of nail    • Disorder of vision    • Dysuria    • Foot pain    • GERD (gastroesophageal reflux disease)    • Left carpal tunnel syndrome    • Local infection of the skin and subcutaneous tissue, unspecified    • Neuropathic pain    • Nevus of left shoulder     WITHOUT CHANGE HAS DERM.   • Osteoarthrosis, generalized, hand    • Pain in right hip    • Pain in unspecified joint    • Papanicolaou smear 06/01/2018   • Pharyngitis    • Reflux esophagitis    • Spondylosis without myelopathy or radiculopathy, cervical region    • Stress, not elsewhere classified    • Tinea unguium    • Unspecified conjunctivitis    • Unspecified otitis externa, right ear    • URI (upper respiratory infection)     ACUTE   • UTI (urinary tract infection)     SITE NOT SPECIFIED     Past Surgical History:   Procedure Laterality Date   • COLONOSCOPY      2012 POLYPS REPEAT 5 YRS 6/17 1 SMALL POLYP REPEAT 5 YRS   • HYSTERECTOMY       Family History   Problem Relation Age of Onset   • No Known Problems Other      Social History     Tobacco Use   • Smoking status: Never Smoker   • Smokeless tobacco: Never Used   Vaping Use   • Vaping Use: Never used   Substance Use Topics   • Alcohol use: Yes     Comment: UNKNOWN   • Drug use: Defer     Medications Prior to Admission   Medication Sig Dispense Refill Last Dose   • Acetaminophen (TYLENOL ARTHRITIS EXT RELIEF PO)    8/18/2022 at Unknown time   • calcium carb-cholecalciferol 600-800 MG-UNIT tablet    Past Week at Unknown time   • esomeprazole (nexIUM 24HR) 20 MG capsule Take 1 capsule by mouth Every Morning Before  "Breakfast. 90 capsule 3 8/18/2022 at Unknown time   • estradiol (ESTRACE) 1 MG tablet Take 1 tablet by mouth Daily for 360 days. (Patient taking differently: Take 0.5 mg by mouth Daily.) 30 tablet 11 8/18/2022 at Unknown time   • fluticasone (FLONASE) 50 MCG/ACT nasal spray    8/18/2022 at Unknown time   • gabapentin (NEURONTIN) 300 MG capsule Take 1 capsule by mouth Daily. 90 capsule 0 8/18/2022 at Unknown time   • Glucosamine-Chondroit-Vit C-Mn (Glucosamine 1500 Complex) capsule    Past Week at Unknown time   • Melatonin 10-10 MG tablet controlled-release    Past Week at Unknown time   • meloxicam (MOBIC) 15 MG tablet Take 1 tablet by mouth Daily As Needed for Mild Pain . 30 tablet 2 Past Week at Unknown time   • montelukast (SINGULAIR) 10 MG tablet TAKE 1 TABLET BY MOUTH EVERY DAY AT NIGHT 30 tablet 3 8/18/2022 at Unknown time   • multivitamin (THERAGRAN) tablet tablet Take  by mouth.   Past Week at Unknown time   • PreviDent 5000 Booster Plus 1.1 % paste See Admin Instructions.   8/18/2022 at Unknown time   • Probiotic Product (PROBIOTIC-10 PO)    8/18/2022 at Unknown time   • pseudoephedrine-guaifenesin (MUCINEX D)  MG per 12 hr tablet    Past Week at Unknown time   • terbinafine (lamiSIL) 250 MG tablet Take 1 tablet by mouth Daily. 90 tablet 0 8/18/2022 at Unknown time   • loratadine (CLARITIN) 10 MG tablet    Unknown at Unknown time     Allergies:  Patient has no known allergies.    REVIEW OF SYSTEMS:  Please see the above history of present illness for pertinent positives and negatives.  The remainder of the patient's systems have been reviewed and are negative.     Vital Signs  Temp:  [97.1 °F (36.2 °C)] 97.1 °F (36.2 °C)  Heart Rate:  [76] 76  Resp:  [16] 16  BP: (135)/(64) 135/64    Flowsheet Rows    Flowsheet Row First Filed Value   Admission Height 154.9 cm (61\") Documented at 08/17/2022 1318   Admission Weight 74.8 kg (165 lb) Documented at 08/17/2022 1318           Physical Exam:  Physical Exam "   Constitutional: Patient appears well-developed and well-nourished and in no acute distress   HEENT:   Head: Normocephalic and atraumatic.   Eyes:  Pupils are equal, round, and reactive to light. EOM are intact. Sclerae are anicteric and non-injected.  Mouth and Throat: Patient has moist mucous membranes. Oropharynx is clear of any erythema or exudate.     Neck: Neck supple. No JVD present. No thyromegaly present. No lymphadenopathy present.  Cardiovascular: Regular rate, regular rhythm, S1 normal and S2 normal.  Exam reveals no gallop and no friction rub.  No murmur heard.  Pulmonary/Chest: Lungs are clear to auscultation bilaterally. No respiratory distress. No wheezes. No rhonchi. No rales.   Abdominal: Soft. Bowel sounds are normal. No distension and no mass. There is no hepatosplenomegaly. There is no tenderness.   Musculoskeletal: Normal Muscle tone  Extremities: No edema. Pulses are palpable in all 4 extremities.  Neurological: Patient is alert and oriented to person, place, and time. Cranial nerves II-XII are grossly intact with no focal deficits.  Skin: Skin is warm. No rash noted. Nails show no clubbing.  No cyanosis or erythema.    Debilities/Disabilities Identified: None  Emotional Behavior: Appropriate     Results Review:   I reviewed the patient's new clinical results.    Lab Results (most recent)     None          Imaging Results (Most Recent)     None        reviewed    ECG/EMG Results (most recent)     None        reviewed    Assessment & Plan   Personal hx colon polyps/  colonoscopy      I discussed the patient's findings and my recommendations with patient.     Irineo Feliz MD  08/19/22  08:16 EDT    Time: 10 min prior to procedure.

## 2022-08-19 NOTE — ANESTHESIA PREPROCEDURE EVALUATION
Anesthesia Evaluation     Patient summary reviewed and Nursing notes reviewed   NPO Solid Status: > 8 hours  NPO Liquid Status: > 2 hours           Airway   Mallampati: II  TM distance: >3 FB  Neck ROM: full  No difficulty expected  Dental - normal exam     Pulmonary - negative pulmonary ROS and normal exam   Cardiovascular - negative cardio ROS and normal exam  Exercise tolerance: good (4-7 METS)        Neuro/Psych- negative ROS  GI/Hepatic/Renal/Endo    (+)  GERD,      Musculoskeletal     Abdominal    Substance History - negative use     OB/GYN negative ob/gyn ROS         Other   arthritis,                      Anesthesia Plan    ASA 2     MAC     intravenous induction     Anesthetic plan, risks, benefits, and alternatives have been provided, discussed and informed consent has been obtained with: patient.        CODE STATUS:

## 2022-08-19 NOTE — ANESTHESIA POSTPROCEDURE EVALUATION
Patient: Florinda Sunshine    Procedure Summary     Date: 08/19/22 Room / Location: [facility] / [facility]    Anesthesia Start: 0851 Anesthesia Stop: 0921    Procedure: COLONOSCOPY WITH POLYPECTOMY (N/A ) Diagnosis:       Personal history of colonic polyps      Diverticulosis      Colon polyp      (Personal history of colonic polyps [Z86.010])    Surgeons: Irineo Feliz MD Provider: Chas Lopez MD    Anesthesia Type: MAC ASA Status: 2          Anesthesia Type: MAC    Vitals  Vitals Value Taken Time   /61 08/19/22 0926   Temp 36.6 °C (97.8 °F) 08/19/22 0920   Pulse 76 08/19/22 0927   Resp 16 08/19/22 0920   SpO2 97 % 08/19/22 0927   Vitals shown include unvalidated device data.        Post Anesthesia Care and Evaluation    Patient location during evaluation: bedside  Patient participation: complete - patient participated  Level of consciousness: awake and alert  Pain management: adequate    Airway patency: patent  Anesthetic complications: No anesthetic complications  PONV Status: controlled  Cardiovascular status: acceptable  Respiratory status: acceptable  Hydration status: acceptable

## 2022-08-19 NOTE — BRIEF OP NOTE
COLONOSCOPY WITH POLYPECTOMY  Progress Note    Florinda Sunshine  8/19/2022    Pre-op Diagnosis:   Personal history of colonic polyps [Z86.010]       Post-Op Diagnosis Codes:     * Personal history of colonic polyps [Z86.010]     * Diverticulosis [K57.90]     * Colon polyp [K63.5]    Procedure/CPT® Codes:        Procedure(s):  COLONOSCOPY WITH POLYPECTOMY    Surgeon(s):  Irineo Feliz MD    Anesthesia: Monitored Anesthesia Care    Staff:   Endo Technician: Melly Cordon RN  Endo Nurse: Joslyn Manzanares RN         Estimated Blood Loss: none    Urine Voided: * No values recorded between 8/19/2022  8:51 AM and 8/19/2022  9:12 AM *    Specimens:                Specimens     ID Source Type Tests Collected By Collected At Frozen?    A Large Intestine, Right / Ascending Colon Polyp · TISSUE PATHOLOGY EXAM   Irineo Feliz MD 8/19/22 0905 No    Description: ASCENDING COLON POLYP    Comment: ASCENDING COLON POLYP                Drains: * No LDAs found *    Findings: Colon to TI fair Prep  Mild Sigmoid Diverticulosis  Polyp-Biopsy        Complications: None          Irineo Feliz MD     Date: 8/19/2022  Time: 09:15 EDT

## 2022-08-22 LAB
LAB AP CASE REPORT: NORMAL
LAB AP CLINICAL INFORMATION: NORMAL
PATH REPORT.FINAL DX SPEC: NORMAL
PATH REPORT.GROSS SPEC: NORMAL

## 2022-09-22 ENCOUNTER — OFFICE VISIT (OUTPATIENT)
Dept: INTERNAL MEDICINE | Facility: CLINIC | Age: 62
End: 2022-09-22

## 2022-09-22 VITALS
TEMPERATURE: 98.4 F | RESPIRATION RATE: 18 BRPM | BODY MASS INDEX: 30.78 KG/M2 | DIASTOLIC BLOOD PRESSURE: 72 MMHG | HEIGHT: 61 IN | WEIGHT: 163 LBS | SYSTOLIC BLOOD PRESSURE: 114 MMHG | OXYGEN SATURATION: 98 % | HEART RATE: 82 BPM

## 2022-09-22 DIAGNOSIS — M54.2 NECK PAIN: Primary | ICD-10-CM

## 2022-09-22 DIAGNOSIS — M19.049 ARTHRITIS OF HAND: ICD-10-CM

## 2022-09-22 DIAGNOSIS — J30.2 SEASONAL ALLERGIES: ICD-10-CM

## 2022-09-22 DIAGNOSIS — M54.2 NECK PAIN: ICD-10-CM

## 2022-09-22 PROCEDURE — 99213 OFFICE O/P EST LOW 20 MIN: CPT | Performed by: INTERNAL MEDICINE

## 2022-09-22 NOTE — TELEPHONE ENCOUNTER
Rx Refill Note  Requested Prescriptions     Pending Prescriptions Disp Refills   • montelukast (SINGULAIR) 10 MG tablet 30 tablet 3     Sig: Take 1 tablet by mouth every night at bedtime.   • gabapentin (NEURONTIN) 300 MG capsule 90 capsule 0     Sig: Take 1 capsule by mouth Daily.   • meloxicam (MOBIC) 15 MG tablet 30 tablet 2     Sig: Take 1 tablet by mouth Daily As Needed for Mild Pain.      Last office visit with prescribing clinician: 9/22/2022      Next office visit with prescribing clinician: Visit date not found            Hasmukh Mondragon MA  09/22/22, 13:32 EDT

## 2022-09-22 NOTE — PROGRESS NOTES
"Chief Complaint  Pain (Follow up ) and Med Refill    Subjective        Florinda Sunshine presents to [facility]  History of Present Illness  Here with chronic neck pain, non radiating, no weakness; doing well with gabapentin 300mg daily, no side effects or issues      Objective   Vital Signs:  /72   Pulse 82   Temp 98.4 °F (36.9 °C)   Resp 18   Ht 154.9 cm (61\")   Wt 73.9 kg (163 lb)   SpO2 98%   BMI 30.80 kg/m²   Estimated body mass index is 30.8 kg/m² as calculated from the following:    Height as of this encounter: 154.9 cm (61\").    Weight as of this encounter: 73.9 kg (163 lb).          Physical Exam  Vitals and nursing note reviewed.   Constitutional:       Appearance: Normal appearance.   HENT:      Head: Normocephalic and atraumatic.      Right Ear: Tympanic membrane, ear canal and external ear normal.      Left Ear: Tympanic membrane, ear canal and external ear normal.      Nose: Nose normal.      Mouth/Throat:      Mouth: Mucous membranes are moist.      Pharynx: Oropharynx is clear.   Eyes:      Extraocular Movements: Extraocular movements intact.      Conjunctiva/sclera: Conjunctivae normal.      Pupils: Pupils are equal, round, and reactive to light.   Cardiovascular:      Rate and Rhythm: Normal rate and regular rhythm.      Pulses: Normal pulses.      Heart sounds: Normal heart sounds.   Pulmonary:      Effort: Pulmonary effort is normal.      Breath sounds: Normal breath sounds. No wheezing, rhonchi or rales.   Abdominal:      General: Abdomen is flat. There is no distension.      Palpations: Abdomen is soft.      Tenderness: There is no abdominal tenderness.   Musculoskeletal:         General: Normal range of motion.      Cervical back: Normal range of motion and neck supple.      Right lower leg: No edema.      Left lower leg: No edema.   Skin:     General: Skin is warm and dry.      Capillary Refill: Capillary refill takes less than 2 " seconds.      Findings: No rash.   Neurological:      General: No focal deficit present.      Mental Status: She is alert and oriented to person, place, and time. Mental status is at baseline.   Psychiatric:         Mood and Affect: Mood normal.         Behavior: Behavior normal.        Result Review :                Assessment and Plan   Diagnoses and all orders for this visit:    1. Neck pain (Primary)    2. Arthritis of hand    3. Seasonal allergies    - continue gabapentin  - discussed risks/benefits/alternatives of meds/treatments  - check ector, uds, ok  - continue singulair, doing well         Follow Up   No follow-ups on file.  Patient was given instructions and counseling regarding her condition or for health maintenance advice. Please see specific information pulled into the AVS if appropriate.

## 2022-09-23 RX ORDER — MONTELUKAST SODIUM 10 MG/1
10 TABLET ORAL
Qty: 30 TABLET | Refills: 3 | Status: SHIPPED | OUTPATIENT
Start: 2022-09-23 | End: 2023-02-27

## 2022-09-23 RX ORDER — GABAPENTIN 300 MG/1
300 CAPSULE ORAL DAILY
Qty: 90 CAPSULE | Refills: 0 | Status: SHIPPED | OUTPATIENT
Start: 2022-09-23 | End: 2022-12-13 | Stop reason: SDUPTHER

## 2022-09-23 RX ORDER — MELOXICAM 15 MG/1
15 TABLET ORAL DAILY PRN
Qty: 30 TABLET | Refills: 2 | Status: SHIPPED | OUTPATIENT
Start: 2022-09-23 | End: 2023-03-28

## 2022-12-13 ENCOUNTER — OFFICE VISIT (OUTPATIENT)
Dept: INTERNAL MEDICINE | Facility: CLINIC | Age: 62
End: 2022-12-13

## 2022-12-13 VITALS
SYSTOLIC BLOOD PRESSURE: 110 MMHG | TEMPERATURE: 98.4 F | RESPIRATION RATE: 16 BRPM | OXYGEN SATURATION: 98 % | BODY MASS INDEX: 30.78 KG/M2 | HEIGHT: 61 IN | WEIGHT: 163 LBS | DIASTOLIC BLOOD PRESSURE: 64 MMHG | HEART RATE: 70 BPM

## 2022-12-13 DIAGNOSIS — E78.2 MIXED HYPERLIPIDEMIA: ICD-10-CM

## 2022-12-13 DIAGNOSIS — M54.2 NECK PAIN: Primary | ICD-10-CM

## 2022-12-13 PROCEDURE — 99214 OFFICE O/P EST MOD 30 MIN: CPT | Performed by: INTERNAL MEDICINE

## 2022-12-13 RX ORDER — GABAPENTIN 300 MG/1
300 CAPSULE ORAL DAILY
Qty: 90 CAPSULE | Refills: 0 | Status: SHIPPED | OUTPATIENT
Start: 2022-12-13 | End: 2023-03-13 | Stop reason: SDUPTHER

## 2022-12-13 NOTE — PROGRESS NOTES
"Chief Complaint  Med Refill (Therapeutic Drug Monitoring )    Subjective        Florinda Sunshine presents to [facility]  History of Present Illness  Neck pain, chronic issue for her; doing ok but the last 1 week has had more muscle pain, tightness; non radiating, non radicular      Objective   Vital Signs:  /64   Pulse 70   Temp 98.4 °F (36.9 °C)   Resp 16   Ht 154.9 cm (61\")   Wt 73.9 kg (163 lb)   SpO2 98%   BMI 30.80 kg/m²   Estimated body mass index is 30.8 kg/m² as calculated from the following:    Height as of this encounter: 154.9 cm (61\").    Weight as of this encounter: 73.9 kg (163 lb).          Physical Exam  Vitals and nursing note reviewed.   Constitutional:       Appearance: Normal appearance.   HENT:      Head: Normocephalic and atraumatic.      Right Ear: External ear normal.      Left Ear: External ear normal.      Nose: Nose normal.      Mouth/Throat:      Mouth: Mucous membranes are moist.      Pharynx: Oropharynx is clear.   Eyes:      Extraocular Movements: Extraocular movements intact.      Conjunctiva/sclera: Conjunctivae normal.   Pulmonary:      Effort: Pulmonary effort is normal. No respiratory distress.   Musculoskeletal:         General: Normal range of motion.      Cervical back: Normal range of motion.   Skin:     Findings: No rash.   Neurological:      General: No focal deficit present.      Mental Status: She is alert. Mental status is at baseline.   Psychiatric:         Mood and Affect: Mood normal.         Behavior: Behavior normal.         Thought Content: Thought content normal.         Judgment: Judgment normal.        Result Review :                Assessment and Plan   Diagnoses and all orders for this visit:    1. Neck pain (Primary)  -     Ambulatory Referral to Physical Therapy Evaluate and treat  -     gabapentin (NEURONTIN) 300 MG capsule; Take 1 capsule by mouth Daily.  Dispense: 90 capsule; Refill: 0    2. Mixed " hyperlipidemia  -     Comprehensive Metabolic Panel  -     Lipid Panel With / Chol / HDL Ratio  -     Hemoglobin A1c    - check labs as above  - continue gabapentin, doing well, santos martinez  - rtc 3 months, pending above         Follow Up   No follow-ups on file.  Patient was given instructions and counseling regarding her condition or for health maintenance advice. Please see specific information pulled into the AVS if appropriate.

## 2022-12-14 LAB
ALBUMIN SERPL-MCNC: 4.6 G/DL (ref 3.5–5.2)
ALBUMIN/GLOB SERPL: 2.4 G/DL
ALP SERPL-CCNC: 92 U/L (ref 39–117)
ALT SERPL-CCNC: 12 U/L (ref 1–33)
AST SERPL-CCNC: 16 U/L (ref 1–32)
BILIRUB SERPL-MCNC: <0.2 MG/DL (ref 0–1.2)
BUN SERPL-MCNC: 9 MG/DL (ref 8–23)
BUN/CREAT SERPL: 13.2 (ref 7–25)
CALCIUM SERPL-MCNC: 9.7 MG/DL (ref 8.6–10.5)
CHLORIDE SERPL-SCNC: 102 MMOL/L (ref 98–107)
CHOLEST SERPL-MCNC: 210 MG/DL (ref 0–200)
CHOLEST/HDLC SERPL: 3.13 {RATIO}
CO2 SERPL-SCNC: 25.2 MMOL/L (ref 22–29)
CREAT SERPL-MCNC: 0.68 MG/DL (ref 0.57–1)
EGFRCR SERPLBLD CKD-EPI 2021: 99.2 ML/MIN/1.73
GLOBULIN SER CALC-MCNC: 1.9 GM/DL
GLUCOSE SERPL-MCNC: 109 MG/DL (ref 65–99)
HBA1C MFR BLD: 5.6 % (ref 4.8–5.6)
HDLC SERPL-MCNC: 67 MG/DL (ref 40–60)
LDLC SERPL CALC-MCNC: 114 MG/DL (ref 0–100)
POTASSIUM SERPL-SCNC: 4.7 MMOL/L (ref 3.5–5.2)
PROT SERPL-MCNC: 6.5 G/DL (ref 6–8.5)
SODIUM SERPL-SCNC: 139 MMOL/L (ref 136–145)
TRIGL SERPL-MCNC: 165 MG/DL (ref 0–150)
VLDLC SERPL CALC-MCNC: 29 MG/DL (ref 5–40)

## 2023-01-04 ENCOUNTER — TREATMENT (OUTPATIENT)
Dept: PHYSICAL THERAPY | Facility: CLINIC | Age: 63
End: 2023-01-04
Payer: COMMERCIAL

## 2023-01-04 DIAGNOSIS — M54.2 PAIN, NECK: Primary | ICD-10-CM

## 2023-01-04 DIAGNOSIS — R29.898 DECREASED ROM OF NECK: ICD-10-CM

## 2023-01-04 PROCEDURE — 97140 MANUAL THERAPY 1/> REGIONS: CPT | Performed by: PHYSICAL THERAPIST

## 2023-01-04 PROCEDURE — 97162 PT EVAL MOD COMPLEX 30 MIN: CPT | Performed by: PHYSICAL THERAPIST

## 2023-01-04 PROCEDURE — 97110 THERAPEUTIC EXERCISES: CPT | Performed by: PHYSICAL THERAPIST

## 2023-01-04 NOTE — PROGRESS NOTES
[facility] Physical Therapy [place]  [address] [facility], [place] [address]  *treated at [place] this date due to water damage at [place] location      Patient: Florinda Sunshine   : 1960  Diagnosis/ICD-10 Code:  Pain, neck [M54.2]  Referring practitioner: Harlan Figueredo MD  NPI: [id number]                                      Date of Initial Visit: 2023  Today's Date: 2023  Patient seen for 1 session         Visit Diagnoses:    ICD-10-CM ICD-9-CM   1. Pain, neck  M54.2 723.1   2. Decreased ROM of neck  R29.898 723.8         Subjective Questionnaire: NDI:16%      Subjective Evaluation    History of Present Illness  Mechanism of injury:   I have pain in my neck on the right side. It sometimes radiates up the back of my head with a tingling; no symptoms into arm. Insidious onset over the last 12-14 years but became worse over the last 2-3 months though not sure why.  Recently traveled home from [place] in the car and that aggravates symptoms.  Curious about trying dry needling.    I don't sleep well but can't really attribute this to my neck.      Symptoms become worse as the day goes on.    Pt has had previous PT for this condition    PMH significant for B carpal tunnel release, previous epidurals    Pt does water fitness daily and helps with mobility.      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  Pain  Current pain ratin  At worst pain ratin  Quality: needle-like, dull ache and discomfort  Alleviating factors: neck massager with .  Exacerbated by: driving, reading, prolonged standing preparing dinner.    Social Support  Lives with: spouse and young children             Objective          Palpation     Right   No palpable tenderness to the upper trapezius.   Hypertonic in the  suboccipitals. Tenderness of the cervical paraspinals and suboccipitals.     Tenderness   Cervical Spine   No tenderness in the spinous process and right 1st rib.     Neurological Testing     Sensation   Cervical/Thoracic   Left   Intact: light touch    Right   Intact: light touch    Active Range of Motion   Cervical/Thoracic Spine   Cervical    Flexion: 40 degrees with pain  Extension: 54 degrees   Left lateral flexion: WFL  Right lateral flexion: WFL  Left rotation: 58 degrees with pain  Right rotation: 47 degrees     Additional Active Range of Motion Details  R C-S pain    Strength/Myotome Testing     Additional Strength Details  B UE myotomes 5/5    Tests   Cervical     Left   Negative alar ligament integrity and Spurling's sign.     Right   Positive Spurling's sign.   Negative alar ligament integrity and active compression ([place]).     Additional Tests Details  - vert a          Assessment & Plan     Assessment  Impairments: abnormal or restricted ROM, activity intolerance, lacks appropriate home exercise program and pain with function  Functional Limitations: uncomfortable because of pain, sitting and standing  Assessment details: Florinda Sunshine is a 62 y.o. female referred to physical therapy for R sided neck pain. She presents with palp tenderness along R cervical musculature, exhibits + R spurlings and decreased/painful C-S AROM.  She does not have any comorbidities that should affect her progress in the plan of care.  Pt would benefit from skilled PT services in order to address listed impairments and increase tolerance to ADLs and recreational activities.  During evaluation, pt educated on anatomy, goal of interventions, posture, and body mechanics to promote healthy lifestyle and improve quality of life.      Prognosis: good    Goals  Plan Goals: STG In 4 weeks  1. Pt to be independent/compliant with HEP without exacerbation of symptoms   2. Pt to exhibit increased cervical flex to at least 50 deg to  allow her to read for at least 10 minutes with tolerable symptoms  3. Pt to report decreased pain with ADL's not > 6/10  4. Improve R cervical rotation by at least 10 degrees     LTG In 8 weeks  1. Pt to exhibit at least 65 degrees of cervical rotn to allow for viewing traffic without  limitations and tolerable symptoms.  2. Pt to demonstrate good postural awareness to allow for greater tolerance to prolonged sitting > 30 minutes as needed for driving  3. Continue/resume yardwork, lifting at work and home activities without   increased symptoms.4. Pt able to perform ADL's and recreational activities without pain > 4/10   5. NDI > 12% to demonstrate improved functional tolerance to ADLs      Plan  Therapy options: will be seen for skilled therapy services  Planned modality interventions: thermotherapy (hydrocollator packs), dry needling, cryotherapy, TENS and ultrasound  Planned therapy interventions: therapeutic activities, stretching, strengthening, home exercise program and manual therapy  Frequency: 2x week  Treatment plan discussed with: patient  Plan details: Access Code: [email or web address]  URL: [email or web address]  Date: 01/04/2023  Prepared by: Allyssa Ji    Exercises  Supine Head Nod with Deep Neck Flexor Activation - 1 x daily - 2 sets - 10 reps - 5 hold  Supine Cervical Rotation AROM on Pillow - 1 x daily - 1 sets - 5 reps  Supine Cervical Rotation - Mid to End Range with Manual Resistance - 1 x daily - 1 sets - 5 reps - 5 hold  Seated Cervical Retraction - 1 x daily - 1 sets - 10 reps - 5 hold  Seated Assisted Cervical Rotation with Towel (Mirrored) - 1 x daily - 1 sets - 5 reps - 10 hold  Seated Scapular Retraction - 1 x daily - 1 sets - 10 reps - 5 hold    Patient was educated on findings of evaluation, purpose of treatment, and goals for therapy.  Treatment options discussed and questions answered.  Patient was educated on exercises/self treatment/pain relief techniques.            History # of  Personal Factors and/or Comorbidities: MODERATE (1-2)  Examination of Body System(s): # of elements: LOW (1-2)  Clinical Presentation: EVOLVING  Clinical Decision Making: LOW       Timed:         Manual Therapy:    10     mins  63171;     Therapeutic Exercise:    15     mins  42578;     Neuromuscular Katherin:    -    mins  57183;    Therapeutic Activity:     5     mins  86893;     Gait Training:      -     mins  28519;     Ultrasound:     -     mins  65874;    Ionto                               -    mins   88901  Self Care                       -     mins   39990    Un-Timed:  Electrical Stimulation:    -     mins  41183 ( );  Dry Needling   (1-2 muscles)       mins 02417 (Self-pay)  Dry Needling (3-4 muscles)        mins 20561 (Self-pay)  Dry Needling Trial          mins DRYNDLTRIAL  (No Charge)  Traction     -     mins 18954  Low Eval     -     Mins  49825  Mod Eval     20     Mins  39155  High Eval                       -     Mins  56897    Timed Treatment:   30   mins   Total Treatment:     60   mins    PT: Allyssa Ji PT     [place] License # [id number]    Electronically signed by Allyssa Ji PT, 1/4/2023, 12:48pm EST    Certification Period: 1/4/2023 thru 4/3/2023  I certify that the therapy services are furnished while this patient is under my care.  The services outlined above are required by this patient, and will be reviewed every 90 days.         Physician Signature:__________________________________________________    PHYSICIAN: Harlan Figueredo MD      DATE:     Please sign and return via fax to [phone]. Thank you, [facility] Physical Therapy.

## 2023-01-09 ENCOUNTER — TREATMENT (OUTPATIENT)
Dept: PHYSICAL THERAPY | Facility: CLINIC | Age: 63
End: 2023-01-09
Payer: COMMERCIAL

## 2023-01-09 DIAGNOSIS — R29.898 DECREASED ROM OF NECK: ICD-10-CM

## 2023-01-09 DIAGNOSIS — M62.9 MUSCULOSKELETAL DISORDER INVOLVING UPPER TRAPEZIUS MUSCLE: ICD-10-CM

## 2023-01-09 DIAGNOSIS — R52 PAIN AGGRAVATED BY LIFTING: ICD-10-CM

## 2023-01-09 DIAGNOSIS — M54.2 PAIN, NECK: Primary | ICD-10-CM

## 2023-01-09 PROCEDURE — 97140 MANUAL THERAPY 1/> REGIONS: CPT | Performed by: PHYSICAL THERAPIST

## 2023-01-09 PROCEDURE — 97110 THERAPEUTIC EXERCISES: CPT | Performed by: PHYSICAL THERAPIST

## 2023-01-09 NOTE — PROGRESS NOTES
[facility] Physical Therapy [place]  [address] [facility] [place] [address]  Phone [phone]  Fax [phone]      Physical Therapy Daily Treatment Note      Patient: Florinda Sunshine   : 1960  Referring practitioner: Harlan Figueredo MD  Date of Initial Visit: Type: THERAPY  Noted: 2023  Today's Date: 2023  Patient seen for 2 sessions       Visit Diagnoses:    ICD-10-CM ICD-9-CM   1. Pain, neck  M54.2 723.1   2. Decreased ROM of neck  R29.898 723.8   3. Pain aggravated by lifting  R52 780.96   4. Musculoskeletal disorder involving upper trapezius muscle  M62.9 723.9       Florinda Sunshine reports: feeling about the same.  No increase in pain.  No spasms or radiation.    Subjective     Objective   See Exercise, Manual, and Modality Logs for complete treatment.       Assessment/Plan  Patient presents with significant tone in scalenes and upper trap.  Added stretching to allievate.  No significant tenderness except with deep pressure at first rib today.  No radicular s/s noted, mainly muscular.      Timed:         Manual Therapy:   15      mins  41997;     Therapeutic Exercise:    8     mins  13599;     Neuromuscular Katherin:    -    mins  01841;    Therapeutic Activity:     -     mins  83849;     Gait Training:      -     mins  49554;     Ultrasound:     -     mins  59855;    Ionto                               -    mins   30668  Self Care                       -     mins   48526  Orthotic training    -     mins 07381      Un-Timed:  Electrical Stimulation:    -     mins  87269 ( );  Dry Needling     -     mins self-pay  Traction     -     mins 41671      Timed Treatment:   23   mins   Total Treatment:     40   mins    Barby Gibbs, PT, OCS, CIDN  [place] License: [id number]

## 2023-01-12 ENCOUNTER — TREATMENT (OUTPATIENT)
Dept: PHYSICAL THERAPY | Facility: CLINIC | Age: 63
End: 2023-01-12
Payer: COMMERCIAL

## 2023-01-12 DIAGNOSIS — M62.9 MUSCULOSKELETAL DISORDER INVOLVING UPPER TRAPEZIUS MUSCLE: ICD-10-CM

## 2023-01-12 DIAGNOSIS — M54.2 PAIN, NECK: Primary | ICD-10-CM

## 2023-01-12 DIAGNOSIS — R52 PAIN AGGRAVATED BY LIFTING: ICD-10-CM

## 2023-01-12 DIAGNOSIS — R29.898 DECREASED ROM OF NECK: ICD-10-CM

## 2023-01-12 PROCEDURE — 97110 THERAPEUTIC EXERCISES: CPT | Performed by: PHYSICAL THERAPIST

## 2023-01-12 PROCEDURE — 97140 MANUAL THERAPY 1/> REGIONS: CPT | Performed by: PHYSICAL THERAPIST

## 2023-01-12 NOTE — PROGRESS NOTES
Physical Therapy Daily Treatment Note    Patient: Florinda Sunshine   : 1960  Referring practitioner: Harlan Figueredo MD  Date of Initial Visit: Type: THERAPY  Noted: 2023  Today's Date: 2023  Patient seen for 3 sessions       Visit Diagnoses:    ICD-10-CM ICD-9-CM   1. Pain, neck  M54.2 723.1   2. Decreased ROM of neck  R29.898 723.8   3. Pain aggravated by lifting  R52 780.96   4. Musculoskeletal disorder involving upper trapezius muscle  M62.9 723.9       Florinda Sunshine reports: her neck is about the same.      Subjective       Objective   See Exercise, Manual, and Modality Logs for complete treatment.     Elevated left first rib with moderate B UT, levator, scalene, and SCM tightness       Assessment & Plan     Assessment    Assessment details: Pt tolerated treatment well with good left first rib position after mobilization.  Pt continues to c/o neck pain with minimal improvement since her evaluation.  Pt with moderate B cervical musculature tightness and tenderness.  Added deep tissue massage today.  Pt tolerated massage fairly well and reported less pain after treatment.  Pt continues to tolerate all stretches and strengthening exercises without pain.  Ended treatment with MHP to decrease pain and tightness.  Will continue to see pt 2x/week for stretching, strengthening, manual therapy, and modalities PRN for pain.       Progress per Plan of Care      Timed:         Manual Therapy:    25     mins  60316;     Therapeutic Exercise:    15     mins  02559;     Neuromuscular Katherin:        mins  89244;    Therapeutic Activity:         mins  46773;     Gait Training:          mins  53239;     Ultrasound:          mins  41435;    Ionto                                   mins   00907  Self Care                            mins   76494  Canalith Repos         mins 49102      Un-Timed:  Electrical Stimulation:         mins  71791 ( );  Dry Needling          mins self-pay  Traction          mins  07671      Timed Treatment:  40    mins   Total Treatment:     50   mins    Hiral Linares, PT  [place] License: [id number]

## 2023-01-17 ENCOUNTER — TREATMENT (OUTPATIENT)
Dept: PHYSICAL THERAPY | Facility: CLINIC | Age: 63
End: 2023-01-17
Payer: COMMERCIAL

## 2023-01-17 DIAGNOSIS — M54.2 PAIN, NECK: Primary | ICD-10-CM

## 2023-01-17 DIAGNOSIS — R29.898 DECREASED ROM OF NECK: ICD-10-CM

## 2023-01-17 DIAGNOSIS — M62.9 MUSCULOSKELETAL DISORDER INVOLVING UPPER TRAPEZIUS MUSCLE: ICD-10-CM

## 2023-01-17 DIAGNOSIS — R52 PAIN AGGRAVATED BY LIFTING: ICD-10-CM

## 2023-01-17 PROCEDURE — 97140 MANUAL THERAPY 1/> REGIONS: CPT | Performed by: PHYSICAL THERAPIST

## 2023-01-17 PROCEDURE — 97110 THERAPEUTIC EXERCISES: CPT | Performed by: PHYSICAL THERAPIST

## 2023-01-17 NOTE — PROGRESS NOTES
Physical Therapy Daily Treatment Note      Patient: Florinda Sunshine   : 1960  Referring practitioner: Harlan Figueredo MD  Date of Initial Visit: Type: THERAPY  Noted: 2023  Today's Date: 2023  Patient seen for 4 sessions       Visit Diagnoses:    ICD-10-CM ICD-9-CM   1. Pain, neck  M54.2 723.1   2. Decreased ROM of neck  R29.898 723.8   3. Pain aggravated by lifting  R52 780.96   4. Musculoskeletal disorder involving upper trapezius muscle  M62.9 723.9       Subjective   Florinda Sunshine reports: my neck is feeling much better since Hiral did the soft tissue massage last visit.  I feel like it's easier to turn my head.     Objective   See Exercise, Manual, and Modality Logs for complete treatment.   No elevation or tenderness of L 1st rib  L C-S rotn 58 deg  R 77 deg    Assessment/Plan   Pt exhibits significant improvement in R C-S rotn and suggested she perform SNAG to L as well due to ROM deficits remaining.  Pt has responded favorably to manual interventions with decreased muscle tension/pain as well as improved mobility. Pt was issued updated HEP printout to facilitate compliance and recall with ex progressions today.  Progress per Plan of Care             Timed:         Manual Therapy:    15     mins  24722;     Therapeutic Exercise:    23     mins  91004;     Neuromuscular Katherin:    -    mins  21574;    Therapeutic Activity:     -     mins  58826;     Gait Training:      -     mins  92499;     Ultrasound:     -     mins  27646;    Ionto                               -    mins   04779  Self Care                       -     mins   69816    Timed Treatment:   38   mins   Total Treatment:     48   mins    Allyssa Ji PT  [place] License: #[id number]

## 2023-01-19 ENCOUNTER — TREATMENT (OUTPATIENT)
Dept: PHYSICAL THERAPY | Facility: CLINIC | Age: 63
End: 2023-01-19
Payer: COMMERCIAL

## 2023-01-19 DIAGNOSIS — M62.9 MUSCULOSKELETAL DISORDER INVOLVING UPPER TRAPEZIUS MUSCLE: ICD-10-CM

## 2023-01-19 DIAGNOSIS — R29.898 DECREASED ROM OF NECK: ICD-10-CM

## 2023-01-19 DIAGNOSIS — M54.2 PAIN, NECK: Primary | ICD-10-CM

## 2023-01-19 DIAGNOSIS — R52 PAIN AGGRAVATED BY LIFTING: ICD-10-CM

## 2023-01-19 PROCEDURE — 97110 THERAPEUTIC EXERCISES: CPT | Performed by: PHYSICAL THERAPIST

## 2023-01-19 PROCEDURE — 97140 MANUAL THERAPY 1/> REGIONS: CPT | Performed by: PHYSICAL THERAPIST

## 2023-01-19 NOTE — PROGRESS NOTES
Physical Therapy Daily Treatment Note    Patient: Florinda Sunshine   : 1960  Referring practitioner: Harlan Figueredo MD  Date of Initial Visit: Type: THERAPY  Noted: 2023  Today's Date: 2023  Patient seen for 5 sessions       Visit Diagnoses:    ICD-10-CM ICD-9-CM   1. Pain, neck  M54.2 723.1   2. Decreased ROM of neck  R29.898 723.8   3. Pain aggravated by lifting  R52 780.96   4. Musculoskeletal disorder involving upper trapezius muscle  M62.9 723.9       Florinda Sunshine reports: she is feeling the best she has in a while.  Will need to be placed on hold for 3 weeks for going out of town to watch her grandson.      Subjective       Objective   See Exercise, Manual, and Modality Logs for complete treatment.     Pt with mild palpable tightness in B SCM, scalenes, UT and levator   Pt with good left first rib position without needing mobilization     Assessment & Plan     Assessment    Assessment details: Pt is responding to treatment with decreased pain, palpable tightness, and improving CROM.  Pt denies having increased soreness or pain after PT.  Pt is independent and compliant with her HEP.  Pt is having to go out of town for 3 weeks, so advised pt to continue her HEP awhile she is out of town.  Pt tolerated all stretches and massage today without pain.  Pt to call to reschedule appointment once she returns to town.          Other      Timed:         Manual Therapy:    20     mins  39076;     Therapeutic Exercise:    21     mins  62753;     Neuromuscular Katherin:        mins  20502;    Therapeutic Activity:          mins  71530;     Gait Training:           mins  08448;     Ultrasound:          mins  55255;    Ionto                                   mins   50861  Self Care                            mins   59165  Canalith Repos         mins 54135      Un-Timed:  Electrical Stimulation:         mins  39098 ( );  Dry Needling          mins self-pay  Traction          mins 07424      Timed Treatment:   41    mins   Total Treatment:    51    mins    Hiral Linares, PT  [place] License: [id number]

## 2023-02-25 DIAGNOSIS — J30.2 SEASONAL ALLERGIES: ICD-10-CM

## 2023-02-27 RX ORDER — MONTELUKAST SODIUM 10 MG/1
TABLET ORAL
Qty: 30 TABLET | Refills: 0 | Status: SHIPPED | OUTPATIENT
Start: 2023-02-27 | End: 2023-03-28

## 2023-02-27 NOTE — TELEPHONE ENCOUNTER
Rx Refill Note  Requested Prescriptions     Pending Prescriptions Disp Refills   • montelukast (SINGULAIR) 10 MG tablet [Pharmacy Med Name: Montelukast Sodium 10 MG Oral Tablet] 30 tablet 0     Sig: TAKE 1 TABLET BY MOUTH EVERY DAY AT BEDTIME      Last office visit with prescribing clinician: 12/13/2022   Last telemedicine visit with prescribing clinician: 3/13/2023   Next office visit with prescribing clinician: 3/13/2023                         Would you like a call back once the refill request has been completed: [] Yes [] No    If the office needs to give you a call back, can they leave a voicemail: [] Yes [] No    Hasmukh Mnodragon MA  02/27/23, 09:31 EST

## 2023-03-01 ENCOUNTER — DOCUMENTATION (OUTPATIENT)
Dept: PHYSICAL THERAPY | Facility: CLINIC | Age: 63
End: 2023-03-01
Payer: COMMERCIAL

## 2023-03-13 ENCOUNTER — OFFICE VISIT (OUTPATIENT)
Dept: INTERNAL MEDICINE | Facility: CLINIC | Age: 63
End: 2023-03-13
Payer: COMMERCIAL

## 2023-03-13 VITALS
WEIGHT: 173 LBS | OXYGEN SATURATION: 98 % | SYSTOLIC BLOOD PRESSURE: 116 MMHG | HEART RATE: 83 BPM | HEIGHT: 61 IN | RESPIRATION RATE: 16 BRPM | BODY MASS INDEX: 32.66 KG/M2 | TEMPERATURE: 98.2 F | DIASTOLIC BLOOD PRESSURE: 64 MMHG

## 2023-03-13 DIAGNOSIS — M54.2 NECK PAIN: Primary | ICD-10-CM

## 2023-03-13 DIAGNOSIS — M25.541 ARTHRALGIA OF BOTH HANDS: ICD-10-CM

## 2023-03-13 DIAGNOSIS — M25.542 ARTHRALGIA OF BOTH HANDS: ICD-10-CM

## 2023-03-13 PROCEDURE — 99214 OFFICE O/P EST MOD 30 MIN: CPT | Performed by: INTERNAL MEDICINE

## 2023-03-13 RX ORDER — GABAPENTIN 300 MG/1
300 CAPSULE ORAL DAILY
Qty: 90 CAPSULE | Refills: 0 | Status: SHIPPED | OUTPATIENT
Start: 2023-03-13

## 2023-03-13 NOTE — PROGRESS NOTES
"Chief Complaint  Neck Pain (Follow up /) and Med Refill    Subjective        Florinda Sunshine presents to [facility]  History of Present Illness  Neck pain, doing well with gabapentin, no side effects; still with some intermittent numbness/tingling in hands, baseline arthritis, weakness at times in thumbs      Objective   Vital Signs:  /64   Pulse 83   Temp 98.2 °F (36.8 °C)   Resp 16   Ht 154.9 cm (61\")   Wt 78.5 kg (173 lb)   SpO2 98%   BMI 32.69 kg/m²   Estimated body mass index is 32.69 kg/m² as calculated from the following:    Height as of this encounter: 154.9 cm (61\").    Weight as of this encounter: 78.5 kg (173 lb).             Physical Exam  Vitals and nursing note reviewed.   Constitutional:       Appearance: Normal appearance.   HENT:      Head: Normocephalic and atraumatic.      Right Ear: External ear normal.      Left Ear: External ear normal.      Nose: Nose normal.      Mouth/Throat:      Mouth: Mucous membranes are moist.      Pharynx: Oropharynx is clear.   Eyes:      Extraocular Movements: Extraocular movements intact.      Conjunctiva/sclera: Conjunctivae normal.   Pulmonary:      Effort: Pulmonary effort is normal. No respiratory distress.   Musculoskeletal:         General: Normal range of motion.      Cervical back: Normal range of motion.   Skin:     Findings: No rash.   Neurological:      General: No focal deficit present.      Mental Status: She is alert. Mental status is at baseline.   Psychiatric:         Mood and Affect: Mood normal.         Behavior: Behavior normal.         Thought Content: Thought content normal.         Judgment: Judgment normal.        Result Review :                   Assessment and Plan   Diagnoses and all orders for this visit:    1. Neck pain (Primary)    2. Arthralgia of both hands      - continue gabapentin, doing well  - santos martinez ok  - given voltaren gel samples for use for hands  - discussed " risks/benefits/alternatives of meds/treatments  - consider hand sugery/ortho evalatuoin pending above  - rtc 3 months or sooner as needed         Follow Up   Return in about 3 months (around 6/13/2023) for Recheck.  Patient was given instructions and counseling regarding her condition or for health maintenance advice. Please see specific information pulled into the AVS if appropriate.

## 2023-03-27 DIAGNOSIS — M19.049 ARTHRITIS OF HAND: ICD-10-CM

## 2023-03-27 DIAGNOSIS — J30.2 SEASONAL ALLERGIES: ICD-10-CM

## 2023-03-28 RX ORDER — MELOXICAM 15 MG/1
TABLET ORAL
Qty: 30 TABLET | Refills: 0 | Status: SHIPPED | OUTPATIENT
Start: 2023-03-28

## 2023-03-28 RX ORDER — MONTELUKAST SODIUM 10 MG/1
TABLET ORAL
Qty: 30 TABLET | Refills: 0 | Status: SHIPPED | OUTPATIENT
Start: 2023-03-28

## 2023-03-28 NOTE — TELEPHONE ENCOUNTER
Rx Refill Note  Requested Prescriptions     Pending Prescriptions Disp Refills   • montelukast (SINGULAIR) 10 MG tablet [Pharmacy Med Name: Montelukast Sodium 10 MG Oral Tablet] 30 tablet 0     Sig: TAKE 1 TABLET BY MOUTH ONCE DAILY AT BEDTIME   • meloxicam (MOBIC) 15 MG tablet [Pharmacy Med Name: Meloxicam 15 MG Oral Tablet] 30 tablet 0     Sig: TAKE 1 TABLET BY MOUTH ONCE DAILY AS NEEDED FOR MILD PAIN      Last office visit with prescribing clinician: 3/13/2023   Last telemedicine visit with prescribing clinician: 6/15/2023   Next office visit with prescribing clinician: 6/15/2023                         Would you like a call back once the refill request has been completed: [] Yes [] No    If the office needs to give you a call back, can they leave a voicemail: [] Yes [] No    Hasmukh Mondragon MA  03/28/23, 11:24 EDT

## 2023-04-30 DIAGNOSIS — M19.049 ARTHRITIS OF HAND: ICD-10-CM

## 2023-04-30 DIAGNOSIS — J30.2 SEASONAL ALLERGIES: ICD-10-CM

## 2023-05-01 RX ORDER — MELOXICAM 15 MG/1
TABLET ORAL
Qty: 30 TABLET | Refills: 0 | Status: SHIPPED | OUTPATIENT
Start: 2023-05-01

## 2023-05-01 RX ORDER — MONTELUKAST SODIUM 10 MG/1
TABLET ORAL
Qty: 30 TABLET | Refills: 0 | Status: SHIPPED | OUTPATIENT
Start: 2023-05-01

## 2023-05-01 NOTE — TELEPHONE ENCOUNTER
Rx Refill Note  Requested Prescriptions     Pending Prescriptions Disp Refills   • meloxicam (MOBIC) 15 MG tablet [Pharmacy Med Name: Meloxicam 15 MG Oral Tablet] 30 tablet 0     Sig: TAKE 1 TABLET BY MOUTH ONCE DAILY AS NEEDED FOR MILD PAIN   • montelukast (SINGULAIR) 10 MG tablet [Pharmacy Med Name: Montelukast Sodium 10 MG Oral Tablet] 30 tablet 0     Sig: TAKE 1 TABLET BY MOUTH ONCE DAILY AT BEDTIME      Last office visit with prescribing clinician: 3/13/2023   Last telemedicine visit with prescribing clinician: 6/15/2023   Next office visit with prescribing clinician: 6/15/2023                         Would you like a call back once the refill request has been completed: [] Yes [] No    If the office needs to give you a call back, can they leave a voicemail: [] Yes [] No    Hasmukh Mondragon MA  05/01/23, 08:34 EDT

## 2023-05-16 ENCOUNTER — TELEPHONE (OUTPATIENT)
Dept: GASTROENTEROLOGY | Facility: CLINIC | Age: 63
End: 2023-05-16
Payer: COMMERCIAL

## 2023-05-16 NOTE — TELEPHONE ENCOUNTER
Provider: DR. WADDELL  Caller: MP SALCEDO  Relationship to Patient: SELF  Phone Number: [phone]  Reason for Call:   PATIENT STATES THAT HER INSURANCE IS SAYING THE PROCEDURE WAS CODED WRONG.  PROCEDURE WAS PERFORMED 8/19/2022.  PLEASE CALL BACK ASAP.

## 2023-05-17 NOTE — TELEPHONE ENCOUNTER
Spoke with patient.  Has been trying to get this resolved since that year.  It's pathology bill.  Did not get bill for colonoscopy.  DX:  Personal history polyps.  Polyps was found.  She has called insurance and them told her it was coded wrong.  Apologized, then only I can advise her to do, to call the lab and/or insurance.  She understands.

## 2023-05-29 DIAGNOSIS — J30.2 SEASONAL ALLERGIES: ICD-10-CM

## 2023-05-29 DIAGNOSIS — M19.049 ARTHRITIS OF HAND: ICD-10-CM

## 2023-05-30 RX ORDER — MONTELUKAST SODIUM 10 MG/1
TABLET ORAL
Qty: 30 TABLET | Refills: 0 | Status: SHIPPED | OUTPATIENT
Start: 2023-05-30

## 2023-05-30 RX ORDER — MELOXICAM 15 MG/1
TABLET ORAL
Qty: 30 TABLET | Refills: 0 | Status: SHIPPED | OUTPATIENT
Start: 2023-05-30

## 2023-05-30 NOTE — TELEPHONE ENCOUNTER
Rx Refill Note  Requested Prescriptions     Pending Prescriptions Disp Refills   • montelukast (SINGULAIR) 10 MG tablet [Pharmacy Med Name: Montelukast Sodium 10 MG Oral Tablet] 30 tablet 0     Sig: TAKE 1 TABLET BY MOUTH ONCE DAILY AT BEDTIME   • meloxicam (MOBIC) 15 MG tablet [Pharmacy Med Name: Meloxicam 15 MG Oral Tablet] 30 tablet 0     Sig: TAKE 1 TABLET BY MOUTH ONCE DAILY AS NEEDED FOR MILD PAIN      Last office visit with prescribing clinician: 3/13/2023   Last telemedicine visit with prescribing clinician: Visit date not found   Next office visit with prescribing clinician: 6/15/2023                         Would you like a call back once the refill request has been completed: [] Yes [] No    If the office needs to give you a call back, can they leave a voicemail: [] Yes [] No    Hasmukh Mondragon MA  05/30/23, 07:57 EDT

## 2023-06-15 ENCOUNTER — OFFICE VISIT (OUTPATIENT)
Dept: INTERNAL MEDICINE | Facility: CLINIC | Age: 63
End: 2023-06-15
Payer: COMMERCIAL

## 2023-06-15 VITALS
HEART RATE: 60 BPM | SYSTOLIC BLOOD PRESSURE: 110 MMHG | RESPIRATION RATE: 16 BRPM | OXYGEN SATURATION: 99 % | WEIGHT: 169 LBS | HEIGHT: 61 IN | DIASTOLIC BLOOD PRESSURE: 64 MMHG | BODY MASS INDEX: 31.91 KG/M2 | TEMPERATURE: 98 F

## 2023-06-15 DIAGNOSIS — Z51.81 THERAPEUTIC DRUG MONITORING: Primary | ICD-10-CM

## 2023-06-15 DIAGNOSIS — M54.2 NECK PAIN: ICD-10-CM

## 2023-06-15 RX ORDER — GABAPENTIN 300 MG/1
300 CAPSULE ORAL DAILY
Qty: 90 CAPSULE | Refills: 0 | Status: SHIPPED | OUTPATIENT
Start: 2023-06-15

## 2023-06-15 NOTE — PROGRESS NOTES
"Chief Complaint  Neck Pain (Med check )    Subjective        Florinda Sunshine presents to [facility]  History of Present Illness  Cervicalgia, neck pain, doing well with gabapentin, no side effects; feels voltaren has helped a lot    Objective   Vital Signs:  /64   Pulse 60   Temp 98 °F (36.7 °C)   Resp 16   Ht 154.9 cm (61\")   Wt 76.7 kg (169 lb)   SpO2 99%   BMI 31.93 kg/m²   Estimated body mass index is 31.93 kg/m² as calculated from the following:    Height as of this encounter: 154.9 cm (61\").    Weight as of this encounter: 76.7 kg (169 lb).             Physical Exam  Vitals and nursing note reviewed.   Constitutional:       Appearance: Normal appearance.   HENT:      Head: Normocephalic and atraumatic.      Right Ear: External ear normal.      Left Ear: External ear normal.      Nose: Nose normal.      Mouth/Throat:      Mouth: Mucous membranes are moist.      Pharynx: Oropharynx is clear.   Eyes:      Extraocular Movements: Extraocular movements intact.      Conjunctiva/sclera: Conjunctivae normal.   Pulmonary:      Effort: Pulmonary effort is normal. No respiratory distress.   Musculoskeletal:         General: Normal range of motion.      Cervical back: Normal range of motion.   Skin:     Findings: No rash.   Neurological:      General: No focal deficit present.      Mental Status: She is alert. Mental status is at baseline.   Psychiatric:         Mood and Affect: Mood normal.         Behavior: Behavior normal.         Thought Content: Thought content normal.         Judgment: Judgment normal.      Result Review :                   Assessment and Plan   Diagnoses and all orders for this visit:    1. Therapeutic drug monitoring (Primary)  -     Urine Drug Screen - Urine, Clean Catch    2. Neck pain  -     gabapentin (NEURONTIN) 300 MG capsule; Take 1 capsule by mouth Daily.  Dispense: 90 capsule; Refill: 0    Other orders  -     Specific Gravity    - " continue gabapentin   =- ector, uds  - rtc to follow up pending above         Follow Up   Return in about 3 months (around 9/15/2023) for Annual physical.  Patient was given instructions and counseling regarding her condition or for health maintenance advice. Please see specific information pulled into the AVS if appropriate.

## 2023-06-17 LAB
AMPHETAMINES UR QL SCN: NEGATIVE NG/ML
BARBITURATES UR QL SCN: NEGATIVE NG/ML
BENZODIAZ UR QL SCN: NEGATIVE NG/ML
BZE UR QL SCN: NEGATIVE NG/ML
CANNABINOIDS UR QL SCN: NEGATIVE NG/ML
CREAT UR-MCNC: 19.2 MG/DL (ref 20–300)
LABORATORY COMMENT REPORT: ABNORMAL
METHADONE UR QL SCN: NEGATIVE NG/ML
OPIATES UR QL SCN: NEGATIVE NG/ML
OXYCODONE+OXYMORPHONE UR QL SCN: NEGATIVE NG/ML
PCP UR QL: NEGATIVE NG/ML
PH UR: 5.5 [PH] (ref 4.5–8.9)
PROPOXYPH UR QL SCN: NEGATIVE NG/ML
SP GR UR: 1

## 2023-07-30 DIAGNOSIS — J30.2 SEASONAL ALLERGIES: ICD-10-CM

## 2023-07-31 RX ORDER — MONTELUKAST SODIUM 10 MG/1
TABLET ORAL
Qty: 30 TABLET | Refills: 0 | Status: SHIPPED | OUTPATIENT
Start: 2023-07-31

## 2023-08-26 DIAGNOSIS — J30.2 SEASONAL ALLERGIES: ICD-10-CM

## 2023-08-28 RX ORDER — MONTELUKAST SODIUM 10 MG/1
TABLET ORAL
Qty: 30 TABLET | Refills: 0 | Status: SHIPPED | OUTPATIENT
Start: 2023-08-28

## 2023-08-28 NOTE — TELEPHONE ENCOUNTER
Rx Refill Note  Requested Prescriptions     Pending Prescriptions Disp Refills    montelukast (SINGULAIR) 10 MG tablet [Pharmacy Med Name: Montelukast Sodium 10 MG Oral Tablet] 30 tablet 0     Sig: TAKE 1 TABLET BY MOUTH ONCE DAILY AT BEDTIME      Last office visit with prescribing clinician: 6/15/2023   Last telemedicine visit with prescribing clinician: Visit date not found   Next office visit with prescribing clinician: 9/19/2023                         Would you like a call back once the refill request has been completed: [] Yes [] No    If the office needs to give you a call back, can they leave a voicemail: [] Yes [] No    Hasmukh Mondragon MA  08/28/23, 07:43 EDT

## 2023-09-19 ENCOUNTER — OFFICE VISIT (OUTPATIENT)
Dept: INTERNAL MEDICINE | Facility: CLINIC | Age: 63
End: 2023-09-19
Payer: COMMERCIAL

## 2023-09-19 VITALS
HEART RATE: 84 BPM | BODY MASS INDEX: 33.46 KG/M2 | RESPIRATION RATE: 18 BRPM | WEIGHT: 177.2 LBS | DIASTOLIC BLOOD PRESSURE: 68 MMHG | HEIGHT: 61 IN | OXYGEN SATURATION: 97 % | TEMPERATURE: 98.4 F | SYSTOLIC BLOOD PRESSURE: 118 MMHG

## 2023-09-19 DIAGNOSIS — M54.2 NECK PAIN: ICD-10-CM

## 2023-09-19 DIAGNOSIS — R25.2 LEG CRAMPS: ICD-10-CM

## 2023-09-19 DIAGNOSIS — Z00.00 WELL ADULT EXAM: Primary | ICD-10-CM

## 2023-09-19 PROCEDURE — 99396 PREV VISIT EST AGE 40-64: CPT | Performed by: INTERNAL MEDICINE

## 2023-09-19 NOTE — PROGRESS NOTES
"Chief Complaint   Patient presents with    Annual Exam    Spasms     Calf- bilateral        Subjective   Florinda Sunshine is a 63 y.o. female.     History of Present Illness  Well adult, doing well overall; has had some calf spasms lately few weeks; no specific swelling, no injury; no changes to meds; no foot pain, no numbness or tingling; she does feel that if she moves her legs this improves     The following portions of the patient's history were reviewed and updated as appropriate: allergies, current medications, past family history, past medical history, past social history, past surgical history, and problem list.    Review of Systems      Objective   Body mass index is 33.48 kg/m².   Vitals:    09/19/23 0809   BP: 118/68   Pulse: 84   Resp: 18   Temp: 98.4 °F (36.9 °C)   SpO2: 97%   Weight: 80.4 kg (177 lb 3.2 oz)   Height: 154.9 cm (61\")        Physical Exam  Vitals and nursing note reviewed.   Constitutional:       Appearance: Normal appearance.   HENT:      Head: Normocephalic and atraumatic.      Right Ear: Tympanic membrane, ear canal and external ear normal.      Left Ear: Tympanic membrane, ear canal and external ear normal.      Nose: Nose normal.      Mouth/Throat:      Mouth: Mucous membranes are moist.      Pharynx: Oropharynx is clear.   Eyes:      Extraocular Movements: Extraocular movements intact.      Conjunctiva/sclera: Conjunctivae normal.      Pupils: Pupils are equal, round, and reactive to light.   Cardiovascular:      Rate and Rhythm: Normal rate and regular rhythm.      Pulses: Normal pulses.      Heart sounds: Normal heart sounds.   Pulmonary:      Effort: Pulmonary effort is normal.      Breath sounds: Normal breath sounds. No wheezing, rhonchi or rales.   Abdominal:      General: Abdomen is flat. There is no distension.      Palpations: Abdomen is soft.      Tenderness: There is no abdominal tenderness.   Musculoskeletal:         General: Normal range of motion.      Cervical back: " Normal range of motion and neck supple.      Right lower leg: No edema.      Left lower leg: No edema.   Skin:     General: Skin is warm and dry.      Capillary Refill: Capillary refill takes less than 2 seconds.      Findings: No rash.   Neurological:      General: No focal deficit present.      Mental Status: She is alert and oriented to person, place, and time. Mental status is at baseline.   Psychiatric:         Mood and Affect: Mood normal.         Behavior: Behavior normal.         Current Outpatient Medications:     Acetaminophen (TYLENOL ARTHRITIS EXT RELIEF PO), , Disp: , Rfl:     calcium carb-cholecalciferol 600-800 MG-UNIT tablet, , Disp: , Rfl:     esomeprazole (nexIUM 24HR) 20 MG capsule, Take 1 capsule by mouth Every Morning Before Breakfast., Disp: 90 capsule, Rfl: 3    fluticasone (FLONASE) 50 MCG/ACT nasal spray, , Disp: , Rfl:     gabapentin (NEURONTIN) 300 MG capsule, Take 1 capsule by mouth Daily., Disp: 90 capsule, Rfl: 0    Glucosamine-Chondroit-Vit C-Mn (Glucosamine 1500 Complex) capsule, , Disp: , Rfl:     loratadine (CLARITIN) 10 MG tablet, , Disp: , Rfl:     Melatonin 10-10 MG tablet controlled-release, , Disp: , Rfl:     meloxicam (MOBIC) 15 MG tablet, TAKE 1 TABLET BY MOUTH ONCE DAILY AS NEEDED FOR MILD PAIN, Disp: 30 tablet, Rfl: 0    montelukast (SINGULAIR) 10 MG tablet, TAKE 1 TABLET BY MOUTH ONCE DAILY AT BEDTIME, Disp: 30 tablet, Rfl: 0    multivitamin (THERAGRAN) tablet tablet, Take  by mouth., Disp: , Rfl:     PreviDent 5000 Booster Plus 1.1 % paste, See Admin Instructions., Disp: , Rfl:     Probiotic Product (PROBIOTIC-10 PO), , Disp: , Rfl:     pseudoephedrine-guaifenesin (MUCINEX D)  MG per 12 hr tablet, , Disp: , Rfl:      Lab Results   Component Value Date    HGBA1C 5.60 12/13/2022    TSH 1.090 03/12/2021    XXIQ86WR 68.9 06/15/2022        Health Maintenance   Topic Date Due    ZOSTER VACCINE (1 of 2) 01/18/2011    INFLUENZA VACCINE  10/01/2023    LIPID PANEL  12/13/2023     BMI FOLLOWUP  12/13/2023    ANNUAL PHYSICAL  09/19/2024    MAMMOGRAM  10/17/2024    TDAP/TD VACCINES (4 - Td or Tdap) 02/06/2030    COLORECTAL CANCER SCREENING  08/19/2032    HEPATITIS C SCREENING  Completed    COVID-19 Vaccine  Completed    Pneumococcal Vaccine 0-64  Aged Out        Immunization History   Administered Date(s) Administered    COVID-19 (MODERNA) BIVALENT 12+YRS 12/17/2022    COVID-19 (PFIZER) Purple Cap Monovalent 03/10/2021, 04/07/2021, 12/03/2021    Flu Vaccine Quad PF >36MO 10/04/2017, 09/21/2018    Fluzone (or Fluarix & Flulaval for VFC) >6mos 10/11/2021    Fluzone Quad >6mos (Multi-dose) 10/11/2021    Hep B, Unspecified 08/02/2019    Hepatitis A 01/21/2019, 08/02/2019    Hepatitis B Adult/Adolescent IM 08/02/2019    Influenza, Unspecified 02/01/2019, 10/01/2019, 10/09/2020, 10/11/2022    Td (TDVAX) 10/17/1996    Tdap 01/01/2010, 02/06/2020    Zoster, Unspecified 10/17/2019, 02/24/2020         Assessment & Plan   Diagnoses and all orders for this visit:    1. Well adult exam (Primary)  -     CBC & Differential  -     Comprehensive Metabolic Panel  -     TSH  -     Lipid Panel With / Chol / HDL Ratio  -     Vitamin D,25-Hydroxy  -     Hemoglobin A1c  -     CK  -     Magnesium  -     Iron and TIBC  -     Ferritin    2. Leg cramps  -     CK  -     Magnesium  -     Iron and TIBC  -     Ferritin    3. Neck pain    - check labs as above, cousneled on water intake, hydration; consider 2/2 RLS as she moves her legs this improves         Well adult exam  - labs checked and evaluated    PAP - up to date, counseled, hysterectomy in 2003 2/2 fibroids  Mammogram - up to date, cousneled yearly screening  Colonoscopy - up to date, last in 8/2022, next in 2027  Glaucoma - yearly  AAA - na  Lung cancer - na  DXA - at 65  HIV - neg  HCV - neg  DM - checking  HLD - checking  Smoking - no  Depression - no, [id number]  Vaccines - counseled  Falls - no  Alcohol Screening - no    Discussed mental health, sexual health,  substance use, abuse, anticipatory guidance given.      No follow-ups on file.     Harlan Figueredo MD  [facility]  Internal Medicine and Pediatrics  Phone: [phone]  Fax: [phone]

## 2023-09-20 LAB
25(OH)D3+25(OH)D2 SERPL-MCNC: 75.1 NG/ML (ref 30–100)
ALBUMIN SERPL-MCNC: 4.7 G/DL (ref 3.5–5.2)
ALBUMIN/GLOB SERPL: 2.2 G/DL
ALP SERPL-CCNC: 87 U/L (ref 39–117)
ALT SERPL-CCNC: 16 U/L (ref 1–33)
AST SERPL-CCNC: 19 U/L (ref 1–32)
BASOPHILS # BLD AUTO: 0.04 10*3/MM3 (ref 0–0.2)
BASOPHILS NFR BLD AUTO: 0.8 % (ref 0–1.5)
BILIRUB SERPL-MCNC: 0.3 MG/DL (ref 0–1.2)
BUN SERPL-MCNC: 9 MG/DL (ref 8–23)
BUN/CREAT SERPL: 15.3 (ref 7–25)
CALCIUM SERPL-MCNC: 10 MG/DL (ref 8.6–10.5)
CHLORIDE SERPL-SCNC: 102 MMOL/L (ref 98–107)
CHOLEST SERPL-MCNC: 213 MG/DL (ref 0–200)
CHOLEST/HDLC SERPL: 3.49 {RATIO}
CK SERPL-CCNC: 56 U/L (ref 20–180)
CO2 SERPL-SCNC: 24 MMOL/L (ref 22–29)
CREAT SERPL-MCNC: 0.59 MG/DL (ref 0.57–1)
EGFRCR SERPLBLD CKD-EPI 2021: 102 ML/MIN/1.73
EOSINOPHIL # BLD AUTO: 0.11 10*3/MM3 (ref 0–0.4)
EOSINOPHIL NFR BLD AUTO: 2.1 % (ref 0.3–6.2)
ERYTHROCYTE [DISTWIDTH] IN BLOOD BY AUTOMATED COUNT: 12.4 % (ref 12.3–15.4)
FERRITIN SERPL-MCNC: 72.9 NG/ML (ref 13–150)
GLOBULIN SER CALC-MCNC: 2.1 GM/DL
GLUCOSE SERPL-MCNC: 101 MG/DL (ref 65–99)
HBA1C MFR BLD: 5.6 % (ref 4.8–5.6)
HCT VFR BLD AUTO: 41.7 % (ref 34–46.6)
HDLC SERPL-MCNC: 61 MG/DL (ref 40–60)
HGB BLD-MCNC: 13.6 G/DL (ref 12–15.9)
IMM GRANULOCYTES # BLD AUTO: 0.01 10*3/MM3 (ref 0–0.05)
IMM GRANULOCYTES NFR BLD AUTO: 0.2 % (ref 0–0.5)
IRON SATN MFR SERPL: 29 % (ref 20–50)
IRON SERPL-MCNC: 100 MCG/DL (ref 37–145)
LDLC SERPL CALC-MCNC: 133 MG/DL (ref 0–100)
LYMPHOCYTES # BLD AUTO: 1.65 10*3/MM3 (ref 0.7–3.1)
LYMPHOCYTES NFR BLD AUTO: 31 % (ref 19.6–45.3)
MAGNESIUM SERPL-MCNC: 2.3 MG/DL (ref 1.6–2.4)
MCH RBC QN AUTO: 29.2 PG (ref 26.6–33)
MCHC RBC AUTO-ENTMCNC: 32.6 G/DL (ref 31.5–35.7)
MCV RBC AUTO: 89.7 FL (ref 79–97)
MONOCYTES # BLD AUTO: 0.38 10*3/MM3 (ref 0.1–0.9)
MONOCYTES NFR BLD AUTO: 7.1 % (ref 5–12)
NEUTROPHILS # BLD AUTO: 3.14 10*3/MM3 (ref 1.7–7)
NEUTROPHILS NFR BLD AUTO: 58.8 % (ref 42.7–76)
NRBC BLD AUTO-RTO: 0 /100 WBC (ref 0–0.2)
PLATELET # BLD AUTO: 363 10*3/MM3 (ref 140–450)
POTASSIUM SERPL-SCNC: 4.4 MMOL/L (ref 3.5–5.2)
PROT SERPL-MCNC: 6.8 G/DL (ref 6–8.5)
RBC # BLD AUTO: 4.65 10*6/MM3 (ref 3.77–5.28)
SODIUM SERPL-SCNC: 139 MMOL/L (ref 136–145)
TIBC SERPL-MCNC: 346 MCG/DL
TRIGL SERPL-MCNC: 105 MG/DL (ref 0–150)
TSH SERPL DL<=0.005 MIU/L-ACNC: 1.17 UIU/ML (ref 0.27–4.2)
UIBC SERPL-MCNC: 246 MCG/DL (ref 112–346)
VLDLC SERPL CALC-MCNC: 19 MG/DL (ref 5–40)
WBC # BLD AUTO: 5.33 10*3/MM3 (ref 3.4–10.8)

## 2023-09-22 RX ORDER — GABAPENTIN 300 MG/1
300 CAPSULE ORAL DAILY
Qty: 90 CAPSULE | Refills: 0 | Status: SHIPPED | OUTPATIENT
Start: 2023-09-22

## 2023-09-22 RX ORDER — PRAMIPEXOLE DIHYDROCHLORIDE 0.12 MG/1
0.12 TABLET ORAL DAILY
Qty: 90 TABLET | Refills: 3 | Status: SHIPPED | OUTPATIENT
Start: 2023-09-22

## 2023-09-27 DIAGNOSIS — J30.2 SEASONAL ALLERGIES: ICD-10-CM

## 2023-09-27 RX ORDER — MONTELUKAST SODIUM 10 MG/1
TABLET ORAL
Qty: 30 TABLET | Refills: 0 | Status: SHIPPED | OUTPATIENT
Start: 2023-09-27

## 2023-10-17 ENCOUNTER — OFFICE VISIT (OUTPATIENT)
Dept: INTERNAL MEDICINE | Facility: CLINIC | Age: 63
End: 2023-10-17
Payer: COMMERCIAL

## 2023-10-17 VITALS
OXYGEN SATURATION: 95 % | RESPIRATION RATE: 16 BRPM | SYSTOLIC BLOOD PRESSURE: 130 MMHG | BODY MASS INDEX: 32.85 KG/M2 | HEART RATE: 98 BPM | DIASTOLIC BLOOD PRESSURE: 82 MMHG | WEIGHT: 174 LBS | HEIGHT: 61 IN | TEMPERATURE: 97.2 F

## 2023-10-17 DIAGNOSIS — J30.2 SEASONAL ALLERGIES: ICD-10-CM

## 2023-10-17 DIAGNOSIS — M54.2 NECK PAIN: ICD-10-CM

## 2023-10-17 DIAGNOSIS — G25.81 RLS (RESTLESS LEGS SYNDROME): Primary | ICD-10-CM

## 2023-10-17 PROCEDURE — 99214 OFFICE O/P EST MOD 30 MIN: CPT | Performed by: INTERNAL MEDICINE

## 2023-10-17 RX ORDER — GABAPENTIN 100 MG/1
100 CAPSULE ORAL DAILY
Qty: 60 CAPSULE | Refills: 0 | Status: CANCELLED | OUTPATIENT
Start: 2023-10-17

## 2023-10-17 RX ORDER — MONTELUKAST SODIUM 10 MG/1
10 TABLET ORAL
Qty: 30 TABLET | Refills: 0 | Status: CANCELLED | OUTPATIENT
Start: 2023-10-17

## 2023-10-17 RX ORDER — MULTIVITAMIN WITH IRON
TABLET ORAL
COMMUNITY
Start: 2023-05-01

## 2023-10-17 NOTE — PROGRESS NOTES
"Chief Complaint  Follow-up    Subjective        Florinda Sunshine presents to [facility]  History of Present Illness  RLS, doing great with pramipexole, no side effects; feels her sleep is much better, feels more rested in the morning    Neck pain, doing great with gabapentin once daily, she has considered stopping this medication    Seasonal allergies, doing well without issues on singulair      Objective   Vital Signs:  /82   Pulse 98   Temp 97.2 °F (36.2 °C)   Resp 16   Ht 154.9 cm (61\")   Wt 78.9 kg (174 lb)   SpO2 95%   BMI 32.88 kg/m²   Estimated body mass index is 32.88 kg/m² as calculated from the following:    Height as of this encounter: 154.9 cm (61\").    Weight as of this encounter: 78.9 kg (174 lb).               Physical Exam  Vitals and nursing note reviewed.   Constitutional:       Appearance: Normal appearance.   HENT:      Head: Normocephalic and atraumatic.      Right Ear: External ear normal.      Left Ear: External ear normal.      Nose: Nose normal.      Mouth/Throat:      Mouth: Mucous membranes are moist.      Pharynx: Oropharynx is clear.   Eyes:      Extraocular Movements: Extraocular movements intact.      Conjunctiva/sclera: Conjunctivae normal.   Pulmonary:      Effort: Pulmonary effort is normal. No respiratory distress.   Musculoskeletal:         General: Normal range of motion.      Cervical back: Normal range of motion.   Skin:     Findings: No rash.   Neurological:      General: No focal deficit present.      Mental Status: She is alert. Mental status is at baseline.   Psychiatric:         Mood and Affect: Mood normal.         Behavior: Behavior normal.         Thought Content: Thought content normal.         Judgment: Judgment normal.        Result Review :                   Assessment and Plan   Diagnoses and all orders for this visit:    1. RLS (restless legs syndrome) (Primary)    2. Neck pain    3. Seasonal allergies    - " RLS, continue pramipexole, doing great, will adjust dose as needed  - discussed risks/benefits/alternatives of meds/treatments  - neck pain, will plan to taper her gabapentin, start with 200mg and then 100mg and then off over the next couple of week  - continue singulair, doing great with this  - rtc 3 months         Follow Up   No follow-ups on file.  Patient was given instructions and counseling regarding her condition or for health maintenance advice. Please see specific information pulled into the AVS if appropriate.

## 2023-10-19 DIAGNOSIS — M54.2 NECK PAIN: Primary | ICD-10-CM

## 2023-10-19 DIAGNOSIS — J30.2 SEASONAL ALLERGIES: ICD-10-CM

## 2023-10-19 RX ORDER — GABAPENTIN 100 MG/1
100 CAPSULE ORAL DAILY
Qty: 30 CAPSULE | Refills: 0 | Status: SHIPPED | OUTPATIENT
Start: 2023-10-19

## 2023-10-19 RX ORDER — MONTELUKAST SODIUM 10 MG/1
10 TABLET ORAL
Qty: 90 TABLET | Refills: 2 | Status: SHIPPED | OUTPATIENT
Start: 2023-10-19

## 2023-11-29 ENCOUNTER — OFFICE VISIT (OUTPATIENT)
Dept: INTERNAL MEDICINE | Facility: CLINIC | Age: 63
End: 2023-11-29
Payer: COMMERCIAL

## 2023-11-29 VITALS
RESPIRATION RATE: 16 BRPM | OXYGEN SATURATION: 97 % | SYSTOLIC BLOOD PRESSURE: 148 MMHG | HEART RATE: 113 BPM | HEIGHT: 61 IN | BODY MASS INDEX: 32.47 KG/M2 | DIASTOLIC BLOOD PRESSURE: 72 MMHG | WEIGHT: 172 LBS | TEMPERATURE: 97 F

## 2023-11-29 DIAGNOSIS — G25.81 RESTLESS LEGS SYNDROME: ICD-10-CM

## 2023-11-29 DIAGNOSIS — J01.00 SUBACUTE MAXILLARY SINUSITIS: ICD-10-CM

## 2023-11-29 DIAGNOSIS — R05.9 COUGH, UNSPECIFIED TYPE: Primary | ICD-10-CM

## 2023-11-29 LAB
EXPIRATION DATE: NORMAL
FLUAV AG UPPER RESP QL IA.RAPID: NOT DETECTED
FLUBV AG UPPER RESP QL IA.RAPID: NOT DETECTED
INTERNAL CONTROL: NORMAL
Lab: NORMAL
SARS-COV-2 AG UPPER RESP QL IA.RAPID: NOT DETECTED

## 2023-11-29 PROCEDURE — 99214 OFFICE O/P EST MOD 30 MIN: CPT | Performed by: INTERNAL MEDICINE

## 2023-11-29 PROCEDURE — 87428 SARSCOV & INF VIR A&B AG IA: CPT | Performed by: INTERNAL MEDICINE

## 2023-11-29 RX ORDER — DEXTROMETHORPHAN HYDROBROMIDE AND PROMETHAZINE HYDROCHLORIDE 15; 6.25 MG/5ML; MG/5ML
2.5 SYRUP ORAL 4 TIMES DAILY PRN
Qty: 200 ML | Refills: 0 | Status: SHIPPED | OUTPATIENT
Start: 2023-11-29

## 2023-11-29 RX ORDER — DOXYCYCLINE HYCLATE 100 MG/1
100 CAPSULE ORAL 2 TIMES DAILY
Qty: 20 CAPSULE | Refills: 0 | Status: SHIPPED | OUTPATIENT
Start: 2023-11-29

## 2023-11-29 RX ORDER — GABAPENTIN 300 MG/1
300 CAPSULE ORAL NIGHTLY
Qty: 30 CAPSULE | Refills: 2 | Status: SHIPPED | OUTPATIENT
Start: 2023-11-29 | End: 2023-11-29

## 2023-11-29 RX ORDER — BENZONATATE 200 MG/1
200 CAPSULE ORAL 3 TIMES DAILY PRN
COMMUNITY

## 2023-11-29 RX ORDER — PRAMIPEXOLE DIHYDROCHLORIDE 0.25 MG/1
0.25 TABLET ORAL NIGHTLY
Qty: 90 TABLET | Refills: 1 | Status: SHIPPED | OUTPATIENT
Start: 2023-11-29

## 2023-11-29 NOTE — PROGRESS NOTES
"Chief Complaint  Cough (Patient has had a cough since the middle of October/Urgent Care prescribed her a 10 day supply of Augmentin, which helped but she's still coughing)    Subjective        Florinda Sunshine presents to [facility]  History of Present Illness  Here with ongoing cough and drainage  She has had since mid October, got a round of amoxicillin which did help initially   But now has had recurrence of her symptoms  No fevers/chills/systemic symptoms    Also mirapex has helped her RLS, would like to increase the dose as it has been a little less effective over the last couple weeks    Objective   Vital Signs:  /72   Pulse 113   Temp 97 °F (36.1 °C)   Resp 16   Ht 154.9 cm (61\")   Wt 78 kg (172 lb)   SpO2 97%   BMI 32.50 kg/m²   Estimated body mass index is 32.5 kg/m² as calculated from the following:    Height as of this encounter: 154.9 cm (61\").    Weight as of this encounter: 78 kg (172 lb).       Physical Exam  Vitals and nursing note reviewed.   Constitutional:       General: She is not in acute distress.     Appearance: Normal appearance.   HENT:      Head: Normocephalic and atraumatic.      Right Ear: External ear normal.      Left Ear: External ear normal.      Nose: Congestion present.      Mouth/Throat:      Mouth: Mucous membranes are moist.      Pharynx: No oropharyngeal exudate or posterior oropharyngeal erythema.   Eyes:      Extraocular Movements: Extraocular movements intact.      Conjunctiva/sclera: Conjunctivae normal.      Pupils: Pupils are equal, round, and reactive to light.   Cardiovascular:      Rate and Rhythm: Normal rate and regular rhythm.      Pulses: Normal pulses.      Heart sounds: Normal heart sounds. No murmur heard.  Pulmonary:      Effort: Pulmonary effort is normal. No respiratory distress.      Breath sounds: Normal breath sounds. No wheezing or rales.   Musculoskeletal:      Cervical back: Normal range of " motion.   Skin:     General: Skin is warm.      Capillary Refill: Capillary refill takes less than 2 seconds.   Neurological:      General: No focal deficit present.      Mental Status: She is alert and oriented to person, place, and time. Mental status is at baseline.      Cranial Nerves: No cranial nerve deficit.   Psychiatric:         Mood and Affect: Mood normal.         Behavior: Behavior normal.         Thought Content: Thought content normal.        Result Review :  The following data was reviewed by: Remberto Nelson MD on 11/29/2023:  Common labs          12/13/2022    12:03 9/19/2023    08:35   Common Labs   Glucose 109  101    BUN 9  9    Creatinine 0.68  0.59    Sodium 139  139    Potassium 4.7  4.4    Chloride 102  102    Calcium 9.7  10.0    Total Protein 6.5  6.8    Albumin 4.60  4.7    Total Bilirubin <0.2  0.3    Alkaline Phosphatase 92  87    AST (SGOT) 16  19    ALT (SGPT) 12  16    WBC  5.33    Hemoglobin  13.6    Hematocrit  41.7    Platelets  363    Total Cholesterol 210  213    Triglycerides 165  105    HDL Cholesterol 67  61    LDL Cholesterol  114  133    Hemoglobin A1C 5.60  5.60      Data reviewed : covid/flu negative             Assessment and Plan   Diagnoses and all orders for this visit:    1. Cough, unspecified type (Primary)  -     Covid-19 + Flu A&B AG, Veritor  -     promethazine-dextromethorphan (PROMETHAZINE-DM) 6.25-15 MG/5ML syrup; Take 2.5 mL by mouth 4 (Four) Times a Day As Needed for Cough.  Dispense: 200 mL; Refill: 0    2. Subacute maxillary sinusitis  -     doxycycline (VIBRAMYCIN) 100 MG capsule; Take 1 capsule by mouth 2 (Two) Times a Day.  Dispense: 20 capsule; Refill: 0    3. Restless legs syndrome  -     pramipexole (Mirapex) 0.25 MG tablet; Take 1 tablet by mouth Every Night.  Dispense: 90 tablet; Refill: 1    Ongoing recurrent sinusitis - change to doxycycline   RLS better on mirapex inc dose to 0.25mg nightly         I spent 20 minutes caring for Florinda on this date of  service. This time includes time spent by me in the following activities:preparing for the visit, reviewing tests, obtaining and/or reviewing a separately obtained history, performing a medically appropriate examination and/or evaluation , counseling and educating the patient/family/caregiver, ordering medications, tests, or procedures, and documenting information in the medical record  Follow Up   Return for Next scheduled follow up.  Patient was given instructions and counseling regarding her condition or for health maintenance advice. Please see specific information pulled into the AVS if appropriate.       Remberto Nelson MD  [facility]  [address] [place] [address]  Phone: [phone]

## 2023-12-20 RX ORDER — METHYLPREDNISOLONE 4 MG/1
TABLET ORAL
Qty: 21 TABLET | Refills: 0 | Status: SHIPPED | OUTPATIENT
Start: 2023-12-20

## 2023-12-20 RX ORDER — LEVOFLOXACIN 750 MG/1
750 TABLET, FILM COATED ORAL DAILY
Qty: 7 TABLET | Refills: 0 | Status: SHIPPED | OUTPATIENT
Start: 2023-12-20

## 2024-01-03 ENCOUNTER — OFFICE VISIT (OUTPATIENT)
Dept: INTERNAL MEDICINE | Facility: CLINIC | Age: 64
End: 2024-01-03
Payer: COMMERCIAL

## 2024-01-03 VITALS
HEART RATE: 125 BPM | BODY MASS INDEX: 32.1 KG/M2 | OXYGEN SATURATION: 96 % | RESPIRATION RATE: 16 BRPM | HEIGHT: 61 IN | TEMPERATURE: 96.3 F | WEIGHT: 170 LBS

## 2024-01-03 DIAGNOSIS — J32.2 CHRONIC ETHMOIDAL SINUSITIS: Primary | ICD-10-CM

## 2024-01-03 PROCEDURE — 99213 OFFICE O/P EST LOW 20 MIN: CPT | Performed by: INTERNAL MEDICINE

## 2024-01-03 RX ORDER — CEFDINIR 300 MG/1
300 CAPSULE ORAL 2 TIMES DAILY
Qty: 60 CAPSULE | Refills: 0 | Status: SHIPPED | OUTPATIENT
Start: 2024-01-03 | End: 2024-01-13

## 2024-01-03 NOTE — PROGRESS NOTES
"Chief Complaint  Earache (LEFT EAR PAIN ), Nasal Congestion (X 4 DAYS ), and Facial Pain    Subjective        Florinda Sunshine presents to [facility]  Earache     Facial Pain      She had an upper respiratory illness back in October and her sinus congestion has persisted since then.  Clarification on the chief complaint.  Her ear congestion has been for 4 days the sinus congestion has been for 2 months.  She does have some facial pressure and pain also.  She took Augmentin initially followed by doxycycline followed by Levaquin.  She felt like she got little better after the Levaquin but it relapsed after discontinued.  Objective   Vital Signs:  Pulse (!) 125   Temp 96.3 °F (35.7 °C) (Temporal)   Resp 16   Ht 154.9 cm (61\")   Wt 77.1 kg (170 lb)   SpO2 96%   BMI 32.12 kg/m²   Estimated body mass index is 32.12 kg/m² as calculated from the following:    Height as of this encounter: 154.9 cm (61\").    Weight as of this encounter: 77.1 kg (170 lb).             Physical Exam  Constitutional:       Appearance: Normal appearance.   HENT:      Head: Normocephalic and atraumatic.      Right Ear: Tympanic membrane and ear canal normal.      Left Ear: Ear canal normal.      Ears:      Comments: Left middle ear effusion     Nose: Congestion present.      Comments: Tenderness of the maxillary sinuses     Mouth/Throat:      Pharynx: Oropharynx is clear. No oropharyngeal exudate or posterior oropharyngeal erythema.   Eyes:      Pupils: Pupils are equal, round, and reactive to light.   Cardiovascular:      Rate and Rhythm: Normal rate and regular rhythm.      Pulses: Normal pulses.      Heart sounds: Normal heart sounds. No murmur heard.  Pulmonary:      Effort: Pulmonary effort is normal. No respiratory distress.      Breath sounds: Normal breath sounds. No stridor. No wheezing.   Abdominal:      General: Abdomen is flat.      Palpations: Abdomen is soft.   Musculoskeletal:      " Cervical back: Normal range of motion and neck supple.      Right lower leg: No edema.      Left lower leg: No edema.   Skin:     General: Skin is warm and dry.      Capillary Refill: Capillary refill takes less than 2 seconds.   Neurological:      General: No focal deficit present.      Mental Status: She is alert and oriented to person, place, and time.        Result Review :                   Assessment and Plan   Diagnoses and all orders for this visit:    1. Chronic ethmoidal sinusitis (Primary)  -     CT Sinuses Limited Study; Future    Other orders  -     cefdinir (OMNICEF) 300 MG capsule; Take 1 capsule by mouth 2 (Two) Times a Day for 10 days.  Dispense: 60 capsule; Refill: 0    Chronic sinusitis and eustachian tube dysfunction and middle ear effusion on the left.  Schedule CT of the sinuses.  Cefdinir for 30 days pending results.  I told her to call back if any intolerance to the cefdinir.         Follow Up   No follow-ups on file.  Patient was given instructions and counseling regarding her condition or for health maintenance advice. Please see specific information pulled into the AVS if appropriate.

## 2024-01-12 DIAGNOSIS — J32.2 CHRONIC ETHMOIDAL SINUSITIS: Primary | ICD-10-CM

## 2024-02-20 ENCOUNTER — HOSPITAL ENCOUNTER (OUTPATIENT)
Dept: CT IMAGING | Facility: HOSPITAL | Age: 64
Discharge: HOME | End: 2024-02-20
Admitting: INTERNAL MEDICINE
Payer: COMMERCIAL

## 2024-02-20 DIAGNOSIS — J32.2 CHRONIC ETHMOIDAL SINUSITIS: ICD-10-CM

## 2024-02-20 PROCEDURE — 70486 CT MAXILLOFACIAL W/O DYE: CPT

## 2024-02-21 ENCOUNTER — TELEPHONE (OUTPATIENT)
Dept: INTERNAL MEDICINE | Facility: CLINIC | Age: 64
End: 2024-02-21
Payer: COMMERCIAL

## 2024-02-21 DIAGNOSIS — H70.92 MASTOIDITIS OF LEFT SIDE: Primary | ICD-10-CM

## 2024-02-21 RX ORDER — LEVOFLOXACIN 500 MG/1
500 TABLET, FILM COATED ORAL DAILY
Qty: 14 TABLET | Refills: 1 | Status: SHIPPED | OUTPATIENT
Start: 2024-02-21

## 2024-02-21 NOTE — TELEPHONE ENCOUNTER
Talk to the patient about her CT scan.  She does have some left mastoiditis.  She completed cefdinir for 30 days.  Levaquin prescription sent in.  She needs to see ENT as well and a referral done.  I did tell her to call her office and see if they could expedite a visit

## 2024-03-03 ENCOUNTER — TELEPHONE (OUTPATIENT)
Dept: INTERNAL MEDICINE | Facility: CLINIC | Age: 64
End: 2024-03-03
Payer: COMMERCIAL

## 2024-04-18 ENCOUNTER — OFFICE VISIT (OUTPATIENT)
Dept: INTERNAL MEDICINE | Facility: CLINIC | Age: 64
End: 2024-04-18
Payer: COMMERCIAL

## 2024-04-18 VITALS
HEIGHT: 61 IN | SYSTOLIC BLOOD PRESSURE: 132 MMHG | BODY MASS INDEX: 32.17 KG/M2 | RESPIRATION RATE: 16 BRPM | WEIGHT: 170.4 LBS | HEART RATE: 88 BPM | OXYGEN SATURATION: 96 % | DIASTOLIC BLOOD PRESSURE: 78 MMHG

## 2024-04-18 DIAGNOSIS — M19.90 ARTHRITIS: Primary | ICD-10-CM

## 2024-04-18 DIAGNOSIS — G25.81 RESTLESS LEGS SYNDROME: ICD-10-CM

## 2024-04-18 RX ORDER — PRAMIPEXOLE DIHYDROCHLORIDE 0.5 MG/1
0.5 TABLET ORAL
Qty: 30 TABLET | Refills: 2 | Status: SHIPPED | OUTPATIENT
Start: 2024-04-18

## 2024-04-18 RX ORDER — CELECOXIB 100 MG/1
100 CAPSULE ORAL 2 TIMES DAILY PRN
Qty: 60 CAPSULE | Refills: 5 | Status: SHIPPED | OUTPATIENT
Start: 2024-04-18

## 2024-04-18 NOTE — PROGRESS NOTES
"Chief Complaint  Establish Care (Previous Dr. Figueredo patient)    Subjective        Florinda Sunshine presents to [facility]  History of Present Illness  Here as transition of care from Dr. Figueredo  She has been seeing ENT for sinus issues and hearing issues, seems post viral, she is gradually improving   Still following with them   Ongoing restless legs initially responsive to mirapex but seems less now  Ongoing hand and great right foot pain/arthritis pain  Negative RA testing but many years ago and mom with history of RA     Objective   Vital Signs:  /78   Pulse 88   Resp 16   Ht 154.9 cm (61\")   Wt 77.3 kg (170 lb 6.4 oz)   SpO2 96%   BMI 32.20 kg/m²   Estimated body mass index is 32.2 kg/m² as calculated from the following:    Height as of this encounter: 154.9 cm (61\").    Weight as of this encounter: 77.3 kg (170 lb 6.4 oz).       Physical Exam  Vitals and nursing note reviewed.   Constitutional:       General: She is not in acute distress.     Appearance: Normal appearance.   HENT:      Head: Normocephalic and atraumatic.      Right Ear: External ear normal.      Left Ear: External ear normal.      Nose: Nose normal. No congestion.      Mouth/Throat:      Mouth: Mucous membranes are moist.      Pharynx: No oropharyngeal exudate or posterior oropharyngeal erythema.   Eyes:      Extraocular Movements: Extraocular movements intact.      Conjunctiva/sclera: Conjunctivae normal.      Pupils: Pupils are equal, round, and reactive to light.   Cardiovascular:      Rate and Rhythm: Normal rate and regular rhythm.      Pulses: Normal pulses.      Heart sounds: Normal heart sounds. No murmur heard.  Pulmonary:      Effort: Pulmonary effort is normal. No respiratory distress.      Breath sounds: Normal breath sounds. No wheezing or rales.   Musculoskeletal:      Cervical back: Normal range of motion.   Skin:     General: Skin is warm.      Capillary Refill: " Capillary refill takes less than 2 seconds.   Neurological:      General: No focal deficit present.      Mental Status: She is alert and oriented to person, place, and time. Mental status is at baseline.      Cranial Nerves: No cranial nerve deficit.   Psychiatric:         Mood and Affect: Mood normal.         Behavior: Behavior normal.         Thought Content: Thought content normal.        Result Review :    The following data was reviewed by: Remberto Nelson MD on 04/18/2024:  Common labs          9/19/2023    08:35   Common Labs   Glucose 101    BUN 9    Creatinine 0.59    Sodium 139    Potassium 4.4    Chloride 102    Calcium 10.0    Total Protein 6.8    Albumin 4.7    Total Bilirubin 0.3    Alkaline Phosphatase 87    AST (SGOT) 19    ALT (SGPT) 16    WBC 5.33    Hemoglobin 13.6    Hematocrit 41.7    Platelets 363    Total Cholesterol 213    Triglycerides 105    HDL Cholesterol 61    LDL Cholesterol  133    Hemoglobin A1C 5.60      Data reviewed : prior office notes reviewed             Assessment and Plan     Diagnoses and all orders for this visit:    1. Arthritis (Primary)  -     Uric acid  -     Comprehensive metabolic panel  -     Sedimentation rate, automated  -     C-reactive protein  -     Rheumatoid Arthritis (RA) Profile  -     celecoxib (CeleBREX) 100 MG capsule; Take 1 capsule by mouth 2 (Two) Times a Day As Needed for Mild Pain.  Dispense: 60 capsule; Refill: 5  Check labs as above, trial celebrex, previously tried meloxicam w/o good relief  F/u 2-4 weeks if not improving    2. Restless legs syndrome  -     pramipexole (Mirapex) 0.5 MG tablet; Take 1 tablet by mouth every night at bedtime.  Dispense: 30 tablet; Refill: 2  Increase mirapex dose to see if more effective, consider alternate agent if not improving.         I spent 20 minutes caring for Florinda on this date of service. This time includes time spent by me in the following activities:preparing for the visit, reviewing tests, obtaining and/or  reviewing a separately obtained history, performing a medically appropriate examination and/or evaluation , counseling and educating the patient/family/caregiver, ordering medications, tests, or procedures, and documenting information in the medical record  Follow Up     F/u 6 months   Patient was given instructions and counseling regarding her condition or for health maintenance advice. Please see specific information pulled into the AVS if appropriate.     Remberto Nelson MD  [facility]  [address] [place] [address]  Phone: [phone]

## 2024-04-19 LAB
ALBUMIN SERPL-MCNC: 4.4 G/DL (ref 3.9–4.9)
ALBUMIN/GLOB SERPL: 1.9 {RATIO} (ref 1.2–2.2)
ALP SERPL-CCNC: 112 IU/L (ref 44–121)
ALT SERPL-CCNC: 15 IU/L (ref 0–32)
AST SERPL-CCNC: 18 IU/L (ref 0–40)
BILIRUB SERPL-MCNC: <0.2 MG/DL (ref 0–1.2)
BUN SERPL-MCNC: 8 MG/DL (ref 8–27)
BUN/CREAT SERPL: 12 (ref 12–28)
CALCIUM SERPL-MCNC: 9.7 MG/DL (ref 8.7–10.3)
CCP IGA+IGG SERPL IA-ACNC: 3 UNITS (ref 0–19)
CHLORIDE SERPL-SCNC: 104 MMOL/L (ref 96–106)
CO2 SERPL-SCNC: 22 MMOL/L (ref 20–29)
CREAT SERPL-MCNC: 0.68 MG/DL (ref 0.57–1)
CRP SERPL-MCNC: 15 MG/L (ref 0–10)
EGFRCR SERPLBLD CKD-EPI 2021: 98 ML/MIN/1.73
ERYTHROCYTE [SEDIMENTATION RATE] IN BLOOD BY WESTERGREN METHOD: 14 MM/HR (ref 0–40)
GLOBULIN SER CALC-MCNC: 2.3 G/DL (ref 1.5–4.5)
GLUCOSE SERPL-MCNC: 95 MG/DL (ref 70–99)
POTASSIUM SERPL-SCNC: 4.5 MMOL/L (ref 3.5–5.2)
PROT SERPL-MCNC: 6.7 G/DL (ref 6–8.5)
RHEUMATOID FACT SERPL-ACNC: 14.7 IU/ML
SODIUM SERPL-SCNC: 140 MMOL/L (ref 134–144)
URATE SERPL-MCNC: 5.1 MG/DL (ref 3–7.2)

## 2024-07-10 DIAGNOSIS — G25.81 RESTLESS LEGS SYNDROME: ICD-10-CM

## 2024-07-10 RX ORDER — PRAMIPEXOLE DIHYDROCHLORIDE 0.5 MG/1
0.5 TABLET ORAL
Qty: 30 TABLET | Refills: 0 | Status: SHIPPED | OUTPATIENT
Start: 2024-07-10

## 2024-08-05 DIAGNOSIS — G25.81 RESTLESS LEGS SYNDROME: ICD-10-CM

## 2024-08-05 RX ORDER — PRAMIPEXOLE DIHYDROCHLORIDE 0.5 MG/1
0.5 TABLET ORAL
Qty: 30 TABLET | Refills: 0 | Status: SHIPPED | OUTPATIENT
Start: 2024-08-05

## 2024-08-09 DIAGNOSIS — J30.2 SEASONAL ALLERGIES: ICD-10-CM

## 2024-08-09 RX ORDER — MONTELUKAST SODIUM 10 MG/1
10 TABLET ORAL
Qty: 90 TABLET | Refills: 2 | Status: SHIPPED | OUTPATIENT
Start: 2024-08-09

## 2024-08-09 NOTE — TELEPHONE ENCOUNTER
Rx Refill Note  Requested Prescriptions     Pending Prescriptions Disp Refills    montelukast (SINGULAIR) 10 MG tablet 90 tablet 2     Sig: Take 1 tablet by mouth every night at bedtime.      Last office visit with prescribing clinician: 4/18/2024   Last telemedicine visit with prescribing clinician: Visit date not found   Next office visit with prescribing clinician: Visit date not found

## 2024-09-06 DIAGNOSIS — G25.81 RESTLESS LEGS SYNDROME: ICD-10-CM

## 2024-09-06 RX ORDER — PRAMIPEXOLE DIHYDROCHLORIDE 0.5 MG/1
0.5 TABLET ORAL
Qty: 30 TABLET | Refills: 0 | Status: SHIPPED | OUTPATIENT
Start: 2024-09-06

## 2024-10-12 DIAGNOSIS — G25.81 RESTLESS LEGS SYNDROME: ICD-10-CM

## 2024-10-12 DIAGNOSIS — M19.90 ARTHRITIS: ICD-10-CM

## 2024-10-14 RX ORDER — CELECOXIB 100 MG/1
100 CAPSULE ORAL 2 TIMES DAILY PRN
Qty: 60 CAPSULE | Refills: 0 | Status: SHIPPED | OUTPATIENT
Start: 2024-10-14

## 2024-10-14 RX ORDER — PRAMIPEXOLE DIHYDROCHLORIDE 0.5 MG/1
0.5 TABLET ORAL
Qty: 30 TABLET | Refills: 0 | Status: SHIPPED | OUTPATIENT
Start: 2024-10-14

## 2024-10-14 NOTE — TELEPHONE ENCOUNTER
Last visit: 1/27/2023    Last Med refill: 9/12/2022  Does patient have enough medication for 72 hours: Yes    Next Visit Date:  Future Appointments   Date Time Provider Danielle Butt   4/28/2023  9:00 AM MD Benson Miller  [id number]   7/28/2023  9:30 AM [facility]ervantes [facility] [address] Maintenance   Topic Date Due    COVID-19 Vaccine (3 - Booster for Moderna series) 08/12/2023 (Originally 2/24/2022)    Pap smear  08/12/2023 (Originally 11/27/2021)    Pneumococcal 0-64 years Vaccine (1 - PCV) 08/12/2042 (Originally 11/27/2006)    Depression Screen  01/27/2024    Chlamydia/GC screen  01/27/2024    DTaP/Tdap/Td vaccine (8 - Td or Tdap) 08/12/2032    Hepatitis A vaccine  Completed    Hib vaccine  Completed    HPV vaccine  Completed    Varicella vaccine  Completed    Meningococcal (ACWY) vaccine  Completed    Flu vaccine  Completed    Hepatitis C screen  Discontinued    HIV screen  Discontinued       No results found for: LABA1C          ( goal A1C is < 7)   No results found for: LABMICR  LDL Cholesterol (mg/dL)   Date Value   08/12/2021 100       (goal LDL is <100)   AST (U/L)   Date Value   03/18/2022 15     ALT (U/L)   Date Value   03/18/2022 12     BUN (mg/dL)   Date Value   03/18/2022 11     BP Readings from Last 3 Encounters:   01/27/23 104/76   12/15/22 110/62   11/09/22 110/64          (goal 120/80)    All Future Testing planned in [facility]  Lab Frequency Next Occurrence   XR HIP LEFT (2-3 VIEWS) Once 03/18/2022   XR FOOT LEFT (MIN 3 VIEWS) Once 12/15/2022               Patient Active Problem List:     Seasonal allergies     Attention deficit disorder (ADD) without hyperactivity     Anxiety disorder     Sever's disease     Mild intermittent asthma without complication     Family history of endometriosis     Dysmenorrhea     Bicornate uterus     Chronic pain of left ankle     Left ankle swelling     Acute sinusitis     Impingement syndrome of left ankle     Strain Rx Refill Note  Requested Prescriptions     Pending Prescriptions Disp Refills    celecoxib (CeleBREX) 100 MG capsule [Pharmacy Med Name: Celecoxib 100 MG Oral Capsule] 60 capsule 0     Sig: TAKE 1 CAPSULE BY MOUTH TWICE DAILY AS NEEDED FOR MILD PAIN    pramipexole (MIRAPEX) 0.5 MG tablet [Pharmacy Med Name: Pramipexole Dihydrochloride 0.5 MG Oral Tablet] 30 tablet 0     Sig: TAKE 1 TABLET BY MOUTH ONCE DAILY AT BEDTIME      Last office visit with prescribing clinician: 4/18/2024   Last telemedicine visit with prescribing clinician: Visit date not found   Next office visit with prescribing clinician: 10/25/2024                         Would you like a call back once the refill request has been completed: [] Yes [] No    If the office needs to give you a call back, can they leave a voicemail: [] Yes [] No    Melina Almeida MA  10/14/24, 07:30 EDT   of latissimus dorsi muscle     URI, acute     Mild intermittent asthma with acute exacerbation

## 2024-10-25 ENCOUNTER — OFFICE VISIT (OUTPATIENT)
Dept: INTERNAL MEDICINE | Facility: CLINIC | Age: 64
End: 2024-10-25
Payer: COMMERCIAL

## 2024-10-25 VITALS
WEIGHT: 173.4 LBS | OXYGEN SATURATION: 96 % | SYSTOLIC BLOOD PRESSURE: 130 MMHG | DIASTOLIC BLOOD PRESSURE: 88 MMHG | BODY MASS INDEX: 32.74 KG/M2 | HEIGHT: 61 IN | HEART RATE: 92 BPM

## 2024-10-25 DIAGNOSIS — G25.81 RESTLESS LEGS SYNDROME: ICD-10-CM

## 2024-10-25 DIAGNOSIS — M19.90 ARTHRITIS: ICD-10-CM

## 2024-10-25 DIAGNOSIS — E78.5 HYPERLIPIDEMIA, UNSPECIFIED HYPERLIPIDEMIA TYPE: ICD-10-CM

## 2024-10-25 DIAGNOSIS — R73.9 BLOOD GLUCOSE ELEVATED: ICD-10-CM

## 2024-10-25 DIAGNOSIS — J32.9 CHRONIC SINUSITIS, UNSPECIFIED LOCATION: Primary | ICD-10-CM

## 2024-10-25 DIAGNOSIS — Z23 IMMUNIZATION DUE: ICD-10-CM

## 2024-10-25 PROCEDURE — 99214 OFFICE O/P EST MOD 30 MIN: CPT | Performed by: INTERNAL MEDICINE

## 2024-10-25 PROCEDURE — 90472 IMMUNIZATION ADMIN EACH ADD: CPT | Performed by: INTERNAL MEDICINE

## 2024-10-25 PROCEDURE — 90715 TDAP VACCINE 7 YRS/> IM: CPT | Performed by: INTERNAL MEDICINE

## 2024-10-25 PROCEDURE — 90656 IIV3 VACC NO PRSV 0.5 ML IM: CPT | Performed by: INTERNAL MEDICINE

## 2024-10-25 PROCEDURE — 90471 IMMUNIZATION ADMIN: CPT | Performed by: INTERNAL MEDICINE

## 2024-10-25 RX ORDER — PRAMIPEXOLE DIHYDROCHLORIDE 0.75 MG/1
0.75 TABLET ORAL
Qty: 90 TABLET | Refills: 1 | Status: SHIPPED | OUTPATIENT
Start: 2024-10-25

## 2024-10-25 RX ORDER — METHYLPREDNISOLONE 4 MG/1
TABLET ORAL
Qty: 21 TABLET | Refills: 0 | Status: SHIPPED | OUTPATIENT
Start: 2024-10-25

## 2024-10-25 RX ORDER — PRAMIPEXOLE DIHYDROCHLORIDE 0.5 MG/1
0.75 TABLET ORAL
Qty: 45 TABLET | Refills: 5 | Status: SHIPPED | OUTPATIENT
Start: 2024-10-25 | End: 2024-10-25

## 2024-10-25 RX ORDER — CELECOXIB 200 MG/1
200 CAPSULE ORAL 2 TIMES DAILY
Qty: 60 CAPSULE | Refills: 5 | Status: SHIPPED | OUTPATIENT
Start: 2024-10-25

## 2024-10-25 RX ORDER — LEVOFLOXACIN 750 MG/1
750 TABLET, FILM COATED ORAL DAILY
Qty: 7 TABLET | Refills: 0 | Status: SHIPPED | OUTPATIENT
Start: 2024-10-25

## 2024-10-26 LAB
CCP IGA+IGG SERPL IA-ACNC: 7 UNITS (ref 0–19)
CHOLEST SERPL-MCNC: 226 MG/DL (ref 0–200)
CRP SERPL-MCNC: 1.35 MG/DL (ref 0–0.5)
HBA1C MFR BLD: 5.6 % (ref 4.8–5.6)
HDLC SERPL-MCNC: 64 MG/DL (ref 40–60)
LDLC SERPL CALC-MCNC: 136 MG/DL (ref 0–100)
RHEUMATOID FACT SERPL-ACNC: 13.4 IU/ML
TRIGL SERPL-MCNC: 146 MG/DL (ref 0–150)
VLDLC SERPL CALC-MCNC: 26 MG/DL (ref 5–40)

## 2024-10-31 NOTE — PROGRESS NOTES
"Chief Complaint  Arthritis    Subjective        Florinda Sunshine presents to [facility]  History of Present Illness  Here for follow up   RLS better with increased dose of mirapex but then seemed not as effective over time  Arthritis better with celebrex but still needing some intermittent motrin   Chronic sinusitis - she saw ENT and has been throughout multiple courses of antibiotics         Objective   Vital Signs:  /88   Pulse 92   Ht 154.9 cm (61\")   Wt 78.7 kg (173 lb 6.4 oz)   SpO2 96%   BMI 32.76 kg/m²   Estimated body mass index is 32.76 kg/m² as calculated from the following:    Height as of this encounter: 154.9 cm (61\").    Weight as of this encounter: 78.7 kg (173 lb 6.4 oz).      Physical Exam  Vitals and nursing note reviewed.   Constitutional:       General: She is not in acute distress.     Appearance: Normal appearance.   HENT:      Head: Normocephalic and atraumatic.      Right Ear: External ear normal.      Left Ear: External ear normal.      Nose: Nose normal. No congestion.      Mouth/Throat:      Mouth: Mucous membranes are moist.      Pharynx: No oropharyngeal exudate or posterior oropharyngeal erythema.   Eyes:      Extraocular Movements: Extraocular movements intact.      Conjunctiva/sclera: Conjunctivae normal.      Pupils: Pupils are equal, round, and reactive to light.   Cardiovascular:      Rate and Rhythm: Normal rate and regular rhythm.      Pulses: Normal pulses.      Heart sounds: Normal heart sounds. No murmur heard.  Pulmonary:      Effort: Pulmonary effort is normal. No respiratory distress.      Breath sounds: Normal breath sounds. No wheezing or rales.   Musculoskeletal:      Cervical back: Normal range of motion.   Skin:     General: Skin is warm.      Capillary Refill: Capillary refill takes less than 2 seconds.   Neurological:      General: No focal deficit present.      Mental Status: She is alert and oriented to " person, place, and time. Mental status is at baseline.      Cranial Nerves: No cranial nerve deficit.   Psychiatric:         Mood and Affect: Mood normal.         Behavior: Behavior normal.         Thought Content: Thought content normal.        Result Review :  The following data was reviewed by: Remberto Nelson MD on 10/25/2024:  Common labs          4/18/2024    13:21 10/25/2024    13:50   Common Labs   Glucose 95     BUN 8     Creatinine 0.68     Sodium 140     Potassium 4.5     Chloride 104     Calcium 9.7     Total Protein 6.7     Albumin 4.4     Total Bilirubin <0.2     Alkaline Phosphatase 112     AST (SGOT) 18     ALT (SGPT) 15     Total Cholesterol  226    Triglycerides  146    HDL Cholesterol  64    LDL Cholesterol   136    Hemoglobin A1C  5.60    Uric Acid 5.1       Data reviewed : prior office notes reviewed           Assessment and Plan   Diagnoses and all orders for this visit:    1. Chronic sinusitis, unspecified location (Primary)  -     levoFLOXacin (Levaquin) 750 MG tablet; Take 1 tablet by mouth Daily.  Dispense: 7 tablet; Refill: 0  -     methylPREDNISolone (MEDROL) 4 MG dose pack; Take as directed on package instructions.  Dispense: 21 tablet; Refill: 0  -     Ambulatory Referral to Allergy  Uncertain etiology at this point but seems may be more allergy related  She did shots previously, we will refer back to allergy  Trial levaquin/medrol to see if that will help in meantime     2. Restless legs syndrome  -     pramipexole (Mirapex) 0.75 MG tablet; Take 1 tablet by mouth every night at bedtime.  Dispense: 90 tablet; Refill: 1  Uncontrolled increase mirapex to 0.75mg qhs    3. Arthritis  -     celecoxib (CeleBREX) 200 MG capsule; Take 1 capsule by mouth 2 (Two) Times a Day.  Dispense: 60 capsule; Refill: 5  -     Rheumatoid Arthritis (RA) Profile  -     C-reactive protein  Uncontrolled increase celebrex to 200mg BID     4. Immunization due  -     Tdap Vaccine => 8yo IM (BOOSTRIX/ADACEL)  -      Fluzone >6mos ([id number])    5. Hyperlipidemia, unspecified hyperlipidemia type  -     Lipid panel    6. Blood glucose elevated  -     Hemoglobin A1c           I spent 15 minutes caring for Florinda on this date of service. This time includes time spent by me in the following activities:preparing for the visit, reviewing tests, obtaining and/or reviewing a separately obtained history, performing a medically appropriate examination and/or evaluation , counseling and educating the patient/family/caregiver, ordering medications, tests, or procedures, and documenting information in the medical record  Follow Up   F/u 3 months  Patient was given instructions and counseling regarding her condition or for health maintenance advice. Please see specific information pulled into the AVS if appropriate.     Remberto Nelson MD  [facility]  [address]  Phone: [phone]

## 2025-01-27 ENCOUNTER — OFFICE VISIT (OUTPATIENT)
Dept: INTERNAL MEDICINE | Facility: CLINIC | Age: 65
End: 2025-01-27
Payer: COMMERCIAL

## 2025-01-27 VITALS
HEIGHT: 61 IN | HEART RATE: 101 BPM | WEIGHT: 176.4 LBS | DIASTOLIC BLOOD PRESSURE: 78 MMHG | RESPIRATION RATE: 16 BRPM | OXYGEN SATURATION: 99 % | SYSTOLIC BLOOD PRESSURE: 124 MMHG | BODY MASS INDEX: 33.3 KG/M2

## 2025-01-27 DIAGNOSIS — M19.90 ARTHRITIS: ICD-10-CM

## 2025-01-27 DIAGNOSIS — R73.9 BLOOD GLUCOSE ELEVATED: ICD-10-CM

## 2025-01-27 DIAGNOSIS — G25.81 RESTLESS LEGS SYNDROME: Primary | ICD-10-CM

## 2025-01-27 DIAGNOSIS — E78.5 HYPERLIPIDEMIA, UNSPECIFIED HYPERLIPIDEMIA TYPE: ICD-10-CM

## 2025-01-27 PROCEDURE — 99214 OFFICE O/P EST MOD 30 MIN: CPT | Performed by: INTERNAL MEDICINE

## 2025-01-27 RX ORDER — AZELASTINE HYDROCHLORIDE, FLUTICASONE PROPIONATE 137; 50 UG/1; UG/1
SPRAY, METERED NASAL
COMMUNITY
Start: 2024-11-14

## 2025-01-27 RX ORDER — MV-MN/OM3/DHA/EPA/FISH/LUT/ZEA 250-5-1 MG
CAPSULE ORAL
COMMUNITY

## 2025-01-27 RX ORDER — EPINEPHRINE 0.3 MG/.3ML
INJECTION SUBCUTANEOUS
COMMUNITY
Start: 2024-11-14

## 2025-01-28 NOTE — PROGRESS NOTES
"Chief Complaint  Follow-up (3 mo f/u)    Subjective        Florinda Sunshine presents to [facility]    medicine check  Symptoms are: recurrent.   Onset was at an unknown time.   Symptoms occur: daily.  Symptoms include: joint pain, nasal congestion and cough.   Pertinent negative symptoms include no abdominal pain, no anorexia, no change in stool, no chest pain, no chills, no fatigue, no fever, no headaches, no joint swelling, no myalgias, no nausea, no neck pain, no numbness, no rash, no sore throat, no swollen glands, no dysuria, no vertigo, no visual change, no vomiting and no weakness.   Additional information: I am coming for a recheck of medications    Here for follow up   Increased dose of celebrex has offered some relief  Along with increased dose of mirapex, she does report symptoms of RLS start around 8pm, and take some time to calm down   She does have a new 2 mo grandchild    Objective   Vital Signs:  /78   Pulse 101   Resp 16   Ht 154.9 cm (61\")   Wt 80 kg (176 lb 6.4 oz)   SpO2 99%   BMI 33.33 kg/m²   Estimated body mass index is 33.33 kg/m² as calculated from the following:    Height as of this encounter: 154.9 cm (61\").    Weight as of this encounter: 80 kg (176 lb 6.4 oz).    Physical Exam  Vitals and nursing note reviewed.   Constitutional:       General: She is not in acute distress.     Appearance: Normal appearance.   HENT:      Head: Normocephalic and atraumatic.      Right Ear: External ear normal.      Left Ear: External ear normal.      Nose: Nose normal. No congestion.      Mouth/Throat:      Mouth: Mucous membranes are moist.      Pharynx: No oropharyngeal exudate or posterior oropharyngeal erythema.   Eyes:      Extraocular Movements: Extraocular movements intact.      Conjunctiva/sclera: Conjunctivae normal.      Pupils: Pupils are equal, round, and reactive to light.   Cardiovascular:      Rate and Rhythm: Normal rate and regular " rhythm.      Pulses: Normal pulses.      Heart sounds: Normal heart sounds. No murmur heard.  Pulmonary:      Effort: Pulmonary effort is normal. No respiratory distress.      Breath sounds: Normal breath sounds. No wheezing or rales.   Musculoskeletal:      Cervical back: Normal range of motion.   Skin:     General: Skin is warm.      Capillary Refill: Capillary refill takes less than 2 seconds.   Neurological:      General: No focal deficit present.      Mental Status: She is alert and oriented to person, place, and time. Mental status is at baseline.      Cranial Nerves: No cranial nerve deficit.   Psychiatric:         Mood and Affect: Mood normal.         Behavior: Behavior normal.         Thought Content: Thought content normal.        Result Review :  The following data was reviewed by: Remberto Nelson MD on 01/27/2025:  Common labs          4/18/2024    13:21 10/25/2024    13:50   Common Labs   Glucose 95     BUN 8     Creatinine 0.68     Sodium 140     Potassium 4.5     Chloride 104     Calcium 9.7     Total Protein 6.7     Albumin 4.4     Total Bilirubin <0.2     Alkaline Phosphatase 112     AST (SGOT) 18     ALT (SGPT) 15     Total Cholesterol  226    Triglycerides  146    HDL Cholesterol  64    LDL Cholesterol   136    Hemoglobin A1C  5.60    Uric Acid 5.1       Data reviewed : prior office notes reviewed           Assessment and Plan   Diagnoses and all orders for this visit:    1. Restless legs syndrome (Primary)  -     CBC w AUTO Differential; Future  -     Comprehensive metabolic panel; Future  -     TSH; Future  -     T4, free; Future  Continue mirapex 0.75mg qhs, discussed taking at 7pm prior to symptom onset to see if that will offer more relief  Also discussed alternative, gabapentin used for neck which didn't offer much relief for the RLS, would consider trial of lyrica, she will send me a message and let me know if we need to change    2. Hyperlipidemia, unspecified hyperlipidemia type  -     Lipid  panel; Future  -     TSH; Future  -     T4, free; Future    3. Blood glucose elevated  -     Hemoglobin A1c; Future    4. Arthritis  Continue celebrex 200mg BID          I spent 15 minutes caring for Florinda on this date of service. This time includes time spent by me in the following activities:preparing for the visit, reviewing tests, obtaining and/or reviewing a separately obtained history, performing a medically appropriate examination and/or evaluation , counseling and educating the patient/family/caregiver, ordering medications, tests, or procedures, and documenting information in the medical record  Follow Up   F/u 6 months  Patient was given instructions and counseling regarding her condition or for health maintenance advice. Please see specific information pulled into the AVS if appropriate.     Remberto Nelson MD  [facility] [place] [facility]  [address]  Phone: [phone]

## 2025-04-24 DIAGNOSIS — J30.2 SEASONAL ALLERGIES: ICD-10-CM

## 2025-04-24 DIAGNOSIS — M19.90 ARTHRITIS: ICD-10-CM

## 2025-04-24 DIAGNOSIS — G25.81 RESTLESS LEGS SYNDROME: ICD-10-CM

## 2025-04-24 RX ORDER — CELECOXIB 200 MG/1
200 CAPSULE ORAL 2 TIMES DAILY
Qty: 60 CAPSULE | Refills: 0 | Status: SHIPPED | OUTPATIENT
Start: 2025-04-24

## 2025-04-24 RX ORDER — PRAMIPEXOLE DIHYDROCHLORIDE 0.75 MG/1
0.75 TABLET ORAL
Qty: 90 TABLET | Refills: 0 | Status: SHIPPED | OUTPATIENT
Start: 2025-04-24

## 2025-04-24 RX ORDER — MONTELUKAST SODIUM 10 MG/1
10 TABLET ORAL
Qty: 90 TABLET | Refills: 0 | Status: SHIPPED | OUTPATIENT
Start: 2025-04-24

## 2025-04-24 NOTE — TELEPHONE ENCOUNTER
Rx Refill Note  Requested Prescriptions     Pending Prescriptions Disp Refills    celecoxib (CeleBREX) 200 MG capsule [Pharmacy Med Name: Celecoxib 200 MG Oral Capsule] 60 capsule 0     Sig: Take 1 capsule by mouth twice daily    montelukast (SINGULAIR) 10 MG tablet [Pharmacy Med Name: Montelukast Sodium 10 MG Oral Tablet] 90 tablet 0     Sig: TAKE 1 TABLET BY MOUTH EVERY DAY AT BEDTIME    pramipexole (MIRAPEX) 0.75 MG tablet [Pharmacy Med Name: Pramipexole Dihydrochloride 0.75 MG Oral Tablet] 90 tablet 0     Sig: TAKE 1 TABLET BY MOUTH EVERY DAY AT BEDTIME      Last office visit with prescribing clinician: 1/27/2025   Last telemedicine visit with prescribing clinician: Visit date not found   Next office visit with prescribing clinician: 7/30/2025                         Would you like a call back once the refill request has been completed: [] Yes [] No    If the office needs to give you a call back, can they leave a voicemail: [] Yes [] No    Lashae Parker MA  04/24/25, 10:00 EDT

## 2025-05-27 DIAGNOSIS — M19.90 ARTHRITIS: ICD-10-CM

## 2025-05-28 RX ORDER — CELECOXIB 200 MG/1
200 CAPSULE ORAL 2 TIMES DAILY
Qty: 60 CAPSULE | Refills: 0 | Status: SHIPPED | OUTPATIENT
Start: 2025-05-28

## 2025-06-10 ENCOUNTER — OFFICE VISIT (OUTPATIENT)
Dept: INTERNAL MEDICINE | Facility: CLINIC | Age: 65
End: 2025-06-10
Payer: COMMERCIAL

## 2025-06-10 VITALS
DIASTOLIC BLOOD PRESSURE: 86 MMHG | BODY MASS INDEX: 33.23 KG/M2 | HEART RATE: 107 BPM | WEIGHT: 176 LBS | HEIGHT: 61 IN | OXYGEN SATURATION: 98 % | SYSTOLIC BLOOD PRESSURE: 134 MMHG

## 2025-06-10 DIAGNOSIS — R30.0 DYSURIA: ICD-10-CM

## 2025-06-10 DIAGNOSIS — N30.01 ACUTE CYSTITIS WITH HEMATURIA: Primary | ICD-10-CM

## 2025-06-10 LAB
BILIRUB BLD-MCNC: NEGATIVE MG/DL
CLARITY, POC: ABNORMAL
COLOR UR: ABNORMAL
EXPIRATION DATE: ABNORMAL
GLUCOSE UR STRIP-MCNC: NEGATIVE MG/DL
KETONES UR QL: NEGATIVE
LEUKOCYTE EST, POC: ABNORMAL
Lab: ABNORMAL
NITRITE UR-MCNC: POSITIVE MG/ML
PH UR: 5 [PH] (ref 5–8)
PROT UR STRIP-MCNC: ABNORMAL MG/DL
RBC # UR STRIP: ABNORMAL /UL
SP GR UR: 1.02 (ref 1–1.03)
UROBILINOGEN UR QL: ABNORMAL

## 2025-06-10 PROCEDURE — 81003 URINALYSIS AUTO W/O SCOPE: CPT

## 2025-06-10 PROCEDURE — 99213 OFFICE O/P EST LOW 20 MIN: CPT

## 2025-06-10 RX ORDER — SULFAMETHOXAZOLE AND TRIMETHOPRIM 800; 160 MG/1; MG/1
1 TABLET ORAL 2 TIMES DAILY
Qty: 6 TABLET | Refills: 0 | Status: SHIPPED | OUTPATIENT
Start: 2025-06-10 | End: 2025-06-13 | Stop reason: SDUPTHER

## 2025-06-10 NOTE — PROGRESS NOTES
"Chief Complaint  Follow-up (UTI, left Ear needs to check)    Subjective        Florinda Sunshine presents to [facility]  History of Present Illness    Florinda presents today for urinary symptoms. She reports burning with urination and pressure in her pelvis/bladder. She reports a recent UTI on 5/3 in which she was seen via telehealth and treated with Macrobid. Her symptoms did resolve. She reports no UTI for several years prior to last month.    She also reports some drainage from her left ear. She reports no ear pain or muffled hearing. She does swim frequently.    Objective   Vital Signs:  /86 (BP Location: Left arm, Patient Position: Sitting, Cuff Size: Adult)   Pulse 107   Ht 154.9 cm (61\")   Wt 79.8 kg (176 lb)   SpO2 98%   BMI 33.25 kg/m²   Estimated body mass index is 33.25 kg/m² as calculated from the following:    Height as of this encounter: 154.9 cm (61\").    Weight as of this encounter: 79.8 kg (176 lb).          Physical Exam  Vitals reviewed.   Constitutional:       General: She is not in acute distress.     Appearance: Normal appearance. She is not ill-appearing.   HENT:      Right Ear: Tympanic membrane, ear canal and external ear normal. No swelling or tenderness. There is no impacted cerumen. Tympanic membrane is not erythematous or bulging.      Left Ear: Tympanic membrane, ear canal and external ear normal. No swelling or tenderness. There is no impacted cerumen. Tympanic membrane is not erythematous or bulging.      Ears:      Comments: Small amount of yellow cerumen in bilateral ear canals  Pulmonary:      Effort: Pulmonary effort is normal.   Skin:     General: Skin is warm.   Neurological:      Mental Status: She is alert.      Motor: No weakness.      Gait: Gait normal.   Psychiatric:         Mood and Affect: Mood normal.         Behavior: Behavior normal.         Thought Content: Thought content normal.         Judgment: Judgment normal. "        Result Review :  The following data was reviewed by: BETHEL Gan on 06/10/2025:    Brief Urine Lab Results  (Last result in the past 365 days)        Color   Clarity   Blood   Leuk Est   Nitrite   Protein   CREAT   Urine HCG        06/10/25 115[address]   Turbid   Large   [address]   Positive   Trace                   Data reviewed : Reviewed chart.          Assessment and Plan   Diagnoses and all orders for this visit:    1. Acute cystitis with hematuria (Primary)  -     sulfamethoxazole-trimethoprim (BACTRIM DS,SEPTRA DS) 800-160 MG per tablet; Take 1 tablet by mouth 2 (Two) Times a Day for 3 days.  Dispense: 6 tablet; Refill: 0    2. Dysuria  -     Urinalysis With Microscopic - Urine, Clean Catch  -     POC Urinalysis Dipstick, Automated  -     Urine Culture - Urine, Urine, Clean Catch    UA is concerning for UTI. Will treat with antibiotics and send urine for culture. Will continue to monitor frequency of UTIs and may want to consider further work up if they become recurrent. Her ear exam is WNL with only a small amount of cerumen. Patient declines ear irrigation today.          Follow Up   Return if symptoms worsen or fail to improve.  Patient was given instructions and counseling regarding her condition or for health maintenance advice. Please see specific information pulled into the AVS if appropriate.

## 2025-06-13 DIAGNOSIS — N30.01 ACUTE CYSTITIS WITH HEMATURIA: ICD-10-CM

## 2025-06-13 RX ORDER — SULFAMETHOXAZOLE AND TRIMETHOPRIM 800; 160 MG/1; MG/1
1 TABLET ORAL 2 TIMES DAILY
Qty: 4 TABLET | Refills: 0 | Status: SHIPPED | OUTPATIENT
Start: 2025-06-13 | End: 2025-06-15

## 2025-06-14 LAB
APPEARANCE UR: ABNORMAL
BACTERIA #/AREA URNS HPF: ABNORMAL /HPF
BACTERIA UR CULT: ABNORMAL
BACTERIA UR CULT: ABNORMAL
BILIRUB UR QL STRIP: NEGATIVE
CASTS URNS MICRO: ABNORMAL
COLOR UR: YELLOW
EPI CELLS #/AREA URNS HPF: ABNORMAL /HPF
GLUCOSE UR QL STRIP: NEGATIVE
HGB UR QL STRIP: ABNORMAL
KETONES UR QL STRIP: NEGATIVE
LEUKOCYTE ESTERASE UR QL STRIP: ABNORMAL
NITRITE UR QL STRIP: NEGATIVE
OTHER ANTIBIOTIC SUSC ISLT: ABNORMAL
PH UR STRIP: 6 [PH] (ref 5–8)
PROT UR QL STRIP: NEGATIVE
RBC #/AREA URNS HPF: ABNORMAL /HPF
SP GR UR STRIP: 1.01 (ref 1–1.03)
UROBILINOGEN UR STRIP-MCNC: ABNORMAL MG/DL
WBC #/AREA URNS HPF: ABNORMAL /HPF

## 2025-06-16 DIAGNOSIS — N30.01 ACUTE CYSTITIS WITH HEMATURIA: Primary | ICD-10-CM

## 2025-06-25 DIAGNOSIS — M19.90 ARTHRITIS: ICD-10-CM

## 2025-06-25 RX ORDER — CELECOXIB 200 MG/1
200 CAPSULE ORAL 2 TIMES DAILY
Qty: 60 CAPSULE | Refills: 0 | Status: SHIPPED | OUTPATIENT
Start: 2025-06-25

## 2025-07-02 ENCOUNTER — TELEPHONE (OUTPATIENT)
Dept: FAMILY MEDICINE CLINIC | Facility: CLINIC | Age: 65
End: 2025-07-02
Payer: COMMERCIAL

## 2025-07-30 DIAGNOSIS — M19.90 ARTHRITIS: ICD-10-CM

## 2025-07-30 DIAGNOSIS — J30.2 SEASONAL ALLERGIES: ICD-10-CM

## 2025-07-30 DIAGNOSIS — G25.81 RESTLESS LEGS SYNDROME: ICD-10-CM

## 2025-07-31 RX ORDER — CELECOXIB 200 MG/1
200 CAPSULE ORAL 2 TIMES DAILY
Qty: 60 CAPSULE | Refills: 0 | Status: SHIPPED | OUTPATIENT
Start: 2025-07-31

## 2025-07-31 RX ORDER — PRAMIPEXOLE DIHYDROCHLORIDE 0.75 MG/1
0.75 TABLET ORAL
Qty: 90 TABLET | Refills: 0 | Status: SHIPPED | OUTPATIENT
Start: 2025-07-31

## 2025-07-31 RX ORDER — MONTELUKAST SODIUM 10 MG/1
10 TABLET ORAL
Qty: 90 TABLET | Refills: 0 | Status: SHIPPED | OUTPATIENT
Start: 2025-07-31

## 2025-08-08 ENCOUNTER — OFFICE VISIT (OUTPATIENT)
Dept: INTERNAL MEDICINE | Facility: CLINIC | Age: 65
End: 2025-08-08
Payer: COMMERCIAL

## 2025-08-08 VITALS
RESPIRATION RATE: 16 BRPM | SYSTOLIC BLOOD PRESSURE: 128 MMHG | BODY MASS INDEX: 32.85 KG/M2 | WEIGHT: 174 LBS | DIASTOLIC BLOOD PRESSURE: 80 MMHG | OXYGEN SATURATION: 98 % | HEART RATE: 85 BPM | HEIGHT: 61 IN

## 2025-08-08 DIAGNOSIS — Z23 IMMUNIZATION DUE: ICD-10-CM

## 2025-08-08 DIAGNOSIS — Z13.89 SCREENING FOR BLOOD OR PROTEIN IN URINE: ICD-10-CM

## 2025-08-08 DIAGNOSIS — M19.90 ARTHRITIS: Primary | ICD-10-CM

## 2025-08-08 DIAGNOSIS — Z00.00 ENCOUNTER FOR ROUTINE ADULT HEALTH EXAMINATION WITHOUT ABNORMAL FINDINGS: ICD-10-CM

## 2025-08-08 DIAGNOSIS — Z76.89 ENCOUNTER FOR WEIGHT MANAGEMENT: ICD-10-CM

## 2025-08-08 DIAGNOSIS — E78.5 HYPERLIPIDEMIA, UNSPECIFIED HYPERLIPIDEMIA TYPE: ICD-10-CM

## 2025-08-08 DIAGNOSIS — E55.9 VITAMIN D DEFICIENCY: ICD-10-CM

## 2025-08-08 LAB
25(OH)D3+25(OH)D2 SERPL-MCNC: 76.2 NG/ML (ref 30–100)
ALBUMIN SERPL-MCNC: 4.8 G/DL (ref 3.5–5.2)
ALBUMIN/GLOB SERPL: 2.2 G/DL
ALP SERPL-CCNC: 103 U/L (ref 39–117)
ALT SERPL-CCNC: 15 U/L (ref 1–33)
APPEARANCE UR: CLEAR
AST SERPL-CCNC: 18 U/L (ref 1–32)
BACTERIA #/AREA URNS HPF: NORMAL /HPF
BASOPHILS # BLD AUTO: 0.02 10*3/MM3 (ref 0–0.2)
BASOPHILS NFR BLD AUTO: 0.3 % (ref 0–1.5)
BILIRUB SERPL-MCNC: 0.3 MG/DL (ref 0–1.2)
BILIRUB UR QL STRIP: NEGATIVE
BUN SERPL-MCNC: 10 MG/DL (ref 8–23)
BUN/CREAT SERPL: 13.2 (ref 7–25)
CALCIUM SERPL-MCNC: 9.8 MG/DL (ref 8.6–10.5)
CASTS URNS MICRO: NORMAL
CHLORIDE SERPL-SCNC: 101 MMOL/L (ref 98–107)
CHOLEST SERPL-MCNC: 221 MG/DL (ref 0–200)
CO2 SERPL-SCNC: 27.3 MMOL/L (ref 22–29)
COLOR UR: YELLOW
CREAT SERPL-MCNC: 0.76 MG/DL (ref 0.57–1)
EGFRCR SERPLBLD CKD-EPI 2021: 87.6 ML/MIN/1.73
EOSINOPHIL # BLD AUTO: 0.09 10*3/MM3 (ref 0–0.4)
EOSINOPHIL NFR BLD AUTO: 1.6 % (ref 0.3–6.2)
EPI CELLS #/AREA URNS HPF: NORMAL /HPF
ERYTHROCYTE [DISTWIDTH] IN BLOOD BY AUTOMATED COUNT: 12.2 % (ref 12.3–15.4)
GLOBULIN SER CALC-MCNC: 2.2 GM/DL
GLUCOSE SERPL-MCNC: 89 MG/DL (ref 65–99)
GLUCOSE UR QL STRIP: NEGATIVE
HBA1C MFR BLD: 5.3 % (ref 4.8–5.6)
HCT VFR BLD AUTO: 42.7 % (ref 34–46.6)
HDLC SERPL-MCNC: 65 MG/DL (ref 40–60)
HGB BLD-MCNC: 13.6 G/DL (ref 12–15.9)
HGB UR QL STRIP: NEGATIVE
IMM GRANULOCYTES # BLD AUTO: 0.01 10*3/MM3 (ref 0–0.05)
IMM GRANULOCYTES NFR BLD AUTO: 0.2 % (ref 0–0.5)
KETONES UR QL STRIP: NEGATIVE
LDLC SERPL CALC-MCNC: 139 MG/DL (ref 0–100)
LEUKOCYTE ESTERASE UR QL STRIP: ABNORMAL
LYMPHOCYTES # BLD AUTO: 2.02 10*3/MM3 (ref 0.7–3.1)
LYMPHOCYTES NFR BLD AUTO: 34.9 % (ref 19.6–45.3)
MCH RBC QN AUTO: 29.4 PG (ref 26.6–33)
MCHC RBC AUTO-ENTMCNC: 31.9 G/DL (ref 31.5–35.7)
MCV RBC AUTO: 92.4 FL (ref 79–97)
MONOCYTES # BLD AUTO: 0.42 10*3/MM3 (ref 0.1–0.9)
MONOCYTES NFR BLD AUTO: 7.3 % (ref 5–12)
NEUTROPHILS # BLD AUTO: 3.23 10*3/MM3 (ref 1.7–7)
NEUTROPHILS NFR BLD AUTO: 55.7 % (ref 42.7–76)
NITRITE UR QL STRIP: NEGATIVE
NRBC BLD AUTO-RTO: 0 /100 WBC (ref 0–0.2)
PH UR STRIP: 7.5 [PH] (ref 5–8)
PLATELET # BLD AUTO: 366 10*3/MM3 (ref 140–450)
POTASSIUM SERPL-SCNC: 4.8 MMOL/L (ref 3.5–5.2)
PROT SERPL-MCNC: 7 G/DL (ref 6–8.5)
PROT UR QL STRIP: NEGATIVE
RBC # BLD AUTO: 4.62 10*6/MM3 (ref 3.77–5.28)
RBC #/AREA URNS HPF: NORMAL /HPF
SODIUM SERPL-SCNC: 139 MMOL/L (ref 136–145)
SP GR UR STRIP: 1.01 (ref 1–1.03)
T4 FREE SERPL-MCNC: 1.04 NG/DL (ref 0.92–1.68)
TRIGL SERPL-MCNC: 95 MG/DL (ref 0–150)
TSH SERPL DL<=0.005 MIU/L-ACNC: 0.99 UIU/ML (ref 0.27–4.2)
URATE SERPL-MCNC: 4.2 MG/DL (ref 2.4–5.7)
UROBILINOGEN UR STRIP-MCNC: ABNORMAL MG/DL
VLDLC SERPL CALC-MCNC: 17 MG/DL (ref 5–40)
WBC # BLD AUTO: 5.79 10*3/MM3 (ref 3.4–10.8)
WBC #/AREA URNS HPF: NORMAL /HPF

## 2025-08-08 RX ORDER — SEMAGLUTIDE 0.25 MG/.5ML
0.25 INJECTION, SOLUTION SUBCUTANEOUS WEEKLY
Qty: 2 ML | Refills: 2 | Status: SHIPPED | OUTPATIENT
Start: 2025-08-08

## 2025-08-08 RX ORDER — PSEUDOEPHEDRINE HCL 120 MG/1
120 TABLET, FILM COATED, EXTENDED RELEASE ORAL EVERY 12 HOURS
COMMUNITY

## 2025-08-09 ENCOUNTER — RESULTS FOLLOW-UP (OUTPATIENT)
Dept: INTERNAL MEDICINE | Facility: CLINIC | Age: 65
End: 2025-08-09
Payer: COMMERCIAL

## 2025-08-12 ENCOUNTER — TELEPHONE (OUTPATIENT)
Dept: INTERNAL MEDICINE | Facility: CLINIC | Age: 65
End: 2025-08-12
Payer: COMMERCIAL

## 2025-08-25 DIAGNOSIS — M19.90 ARTHRITIS: ICD-10-CM

## 2025-08-25 RX ORDER — CELECOXIB 200 MG/1
200 CAPSULE ORAL 2 TIMES DAILY
Qty: 60 CAPSULE | Refills: 0 | Status: SHIPPED | OUTPATIENT
Start: 2025-08-25

## (undated) DEVICE — JACKT LAB F/R KNIT CUFF/COLR XLG BLU

## (undated) DEVICE — VIAL FORMLN CAP 10PCT 20ML

## (undated) DEVICE — SINGLE-USE BIOPSY FORCEPS: Brand: RADIAL JAW 4

## (undated) DEVICE — FLEX ADVANTAGE 1500CC: Brand: FLEX ADVANTAGE

## (undated) DEVICE — CANN NASL CO2 TRULINK W/O2 A/

## (undated) DEVICE — Device

## (undated) DEVICE — KT ORCA ORCAPOD DISP STRL